# Patient Record
Sex: FEMALE | HISPANIC OR LATINO | Employment: OTHER
[De-identification: names, ages, dates, MRNs, and addresses within clinical notes are randomized per-mention and may not be internally consistent; named-entity substitution may affect disease eponyms.]

---

## 2017-10-22 ENCOUNTER — HEALTH MAINTENANCE LETTER (OUTPATIENT)
Age: 42
End: 2017-10-22

## 2017-11-03 ENCOUNTER — TELEPHONE (OUTPATIENT)
Dept: FAMILY MEDICINE | Facility: CLINIC | Age: 42
End: 2017-11-03

## 2017-11-03 NOTE — LETTER
November 3, 2017          Paige Moser  [address]  [facility] [address]            Dear Paige Moser,      At [facility] we care about your health and are committed to providing quality patient care. Regular appointments are a vital part of the care and management of your health and can help prevent many of the complications that can occur.      It has come to our attention that you are due for Diabetes check.  Please call [facility] at [phone] soon to schedule your follow up appointment.    If you have transferred care to another clinic please call to inform us so that we do not continue to send you reminder letters.      Sincerely,      [facility] Care Team/mary

## 2017-11-03 NOTE — TELEPHONE ENCOUNTER
Panel Management Review      Lab Results   Component Value Date    A1C 8.0 08/19/2014     Last Office Visit with this department: Visit date not found    Fail List measure: DM      Patient is due/failing the following:   A1C    Action needed:   Patient needs office visit for DM.    Type of outreach:    Sent letter.    Questions for provider review:    None                                                                                                                                    Snehal Chang CMA

## 2018-09-12 ENCOUNTER — OFFICE VISIT (OUTPATIENT)
Dept: URGENT CARE | Facility: URGENT CARE | Age: 43
End: 2018-09-12
Payer: COMMERCIAL

## 2018-09-12 VITALS
SYSTOLIC BLOOD PRESSURE: 131 MMHG | TEMPERATURE: 98.3 F | WEIGHT: 186 LBS | OXYGEN SATURATION: 96 % | BODY MASS INDEX: 36.33 KG/M2 | DIASTOLIC BLOOD PRESSURE: 88 MMHG | RESPIRATION RATE: 16 BRPM | HEART RATE: 90 BPM

## 2018-09-12 DIAGNOSIS — R29.898 RIGHT ARM WEAKNESS: ICD-10-CM

## 2018-09-12 DIAGNOSIS — M79.601 RIGHT ARM PAIN: Primary | ICD-10-CM

## 2018-09-12 PROCEDURE — 99214 OFFICE O/P EST MOD 30 MIN: CPT | Performed by: NURSE PRACTITIONER

## 2018-09-12 RX ORDER — METHYLPREDNISOLONE 4 MG
TABLET, DOSE PACK ORAL
Qty: 21 TABLET | Refills: 0 | Status: SHIPPED | OUTPATIENT
Start: 2018-09-12 | End: 2018-09-13

## 2018-09-12 ASSESSMENT — ENCOUNTER SYMPTOMS
FEVER: 0
SHORTNESS OF BREATH: 0
SORE THROAT: 0
DIARRHEA: 0
RHINORRHEA: 0
HEADACHES: 0
NAUSEA: 0
CHILLS: 0
COUGH: 0
VOMITING: 0

## 2018-09-12 ASSESSMENT — PAIN SCALES - GENERAL: PAINLEVEL: WORST PAIN (10)

## 2018-09-12 NOTE — MR AVS SNAPSHOT
After Visit Summary   9/12/2018    Paige Davis    MRN: [id number]           Patient Information     Date Of Birth          1975        Visit Information        Provider Department      9/12/2018 11:05 AM Jackelyn Escalona NP [facility]        Today's Diagnoses     Right arm pain    -  1    Right arm weakness          Care Instructions      Acute Pain, Uncertain Cause  Pain can be caused by many conditions that range from very minor to very serious. In some cases, though, pain comes and goes with no apparent cause.  We were not able to find the exact cause for your pain. At this time there is no sign of any serious illness causing your pain. More tests may be needed to determine the cause. In many cases, pain like this goes away by itself.  Home care  Take any medicines as prescribed. If another medicine was not prescribed for pain, you can take an over-the-counter pain medicine such as ibuprofen or acetaminophen. Use these as directed on the label.    Follow-up care  Follow up with your healthcare provider or our staff as directed.  When to seek medical advice  Call your healthcare provider for any of the following:    Pain changes in pattern    Pain doesn't lessen or gets worse    New symptoms appear    Fever of 100.4 F (38 C) or higher, or as directed by your healthcare provider  Date Last Reviewed: 7/26/2015 2000-2017 [employer] [address] [place] [address]. All rights reserved. This information is not intended as a substitute for professional medical care. Always follow your healthcare professional's instructions.                Follow-ups after your visit        Additional Services     NEUROLOGY ADULT REFERRAL       Your provider has referred you for the following:   Consult at [facility]: [facility] [phone]   [email or web address]    Please be aware that  coverage of these services is subject to the terms and limitations of your health insurance plan.  Call member services at your health plan with any benefit or coverage questions.      Please bring the following with you to your appointment:    (1) Any X-Rays, CTs or MRIs which have been performed.  Contact the facility where they were done to arrange for  prior to your scheduled appointment.    (2) List of current medications  (3) This referral request   (4) Any documents/labs given to you for this referral                  Who to contact     If you have questions or need follow up information about today's clinic visit or your schedule please contact [facility] directly at [phone].  Normal or non-critical lab and imaging results will be communicated to you by MyChart, letter or phone within 4 business days after the clinic has received the results. If you do not hear from us within 7 days, please contact the clinic through MyChart or phone. If you have a critical or abnormal lab result, we will notify you by phone as soon as possible.  Submit refill requests through [employer] or call your pharmacy and they will forward the refill request to us. Please allow 3 business days for your refill to be completed.          Additional Information About Your Visit        Care EveryWhere ID     This is your Care EveryWhere ID. This could be used by other organizations to access your [facility] medical records  [id number]        Your Vitals Were     Pulse Temperature Respirations Pulse Oximetry BMI (Body Mass Index)       90 98.3  F (36.8  C) (Oral) 16 96% 36.33 kg/m2        Blood Pressure from Last 3 Encounters:   09/12/18 131/88   12/22/16 130/82   09/04/14 130/88    Weight from Last 3 Encounters:   09/12/18 186 lb (84.4 kg)   12/22/16 194 lb (88 kg)   09/04/14 199 lb 6 oz (90.4 kg)              We Performed the Following     NEUROLOGY ADULT REFERRAL          Today's Medication Changes           These changes are accurate as of 9/12/18 11:44 AM.  If you have any questions, ask your nurse or doctor.               Start taking these medicines.        Dose/Directions    acetaminophen-codeine 300-30 MG per tablet   Commonly known as:  TYLENOL #3   Used for:  Right arm pain, Right arm weakness   Started by:  Jackelyn Escalona NP        Dose:  1 tablet   Take 1 tablet by mouth every 6 hours as needed for severe pain   Quantity:  16 tablet   Refills:  0       methylPREDNISolone 4 MG tablet   Commonly known as:  MEDROL DOSEPAK   Used for:  Right arm weakness, Right arm pain   Started by:  Jackelyn Escalona NP        Follow package instructions   Quantity:  21 tablet   Refills:  0            Where to get your medicines      These medications were sent to [facility] - [place] - [address]  [address], [place] [address]     Phone:  [phone]     methylPREDNISolone 4 MG tablet         Some of these will need a paper prescription and others can be bought over the counter.  Ask your nurse if you have questions.     Bring a paper prescription for each of these medications     acetaminophen-codeine 300-30 MG per tablet               Information about OPIOIDS     PRESCRIPTION OPIOIDS: WHAT YOU NEED TO KNOW   We gave you an opioid (narcotic) pain medicine. It is important to manage your pain, but opioids are not always the best choice. You should first try all the other options your care team gave you. Take this medicine for as short a time (and as few doses) as possible.    Some activities can increase your pain, such as bandage changes or therapy sessions. It may help to take your pain medicine 30 to 60 minutes before these activities. Reduce your stress by getting enough sleep, working on hobbies you enjoy and practicing relaxation or meditation. Talk to your care team about ways to manage your pain beyond prescription opioids.    These medicines have risks:    DO NOT drive when  on new or higher doses of pain medicine. These medicines can affect your alertness and reaction times, and you could be arrested for driving under the influence (DUI). If you need to use opioids long-term, talk to your care team about driving.    DO NOT operate heavy machinery    DO NOT do any other dangerous activities while taking these medicines.    DO NOT drink any alcohol while taking these medicines.     If the opioid prescribed includes acetaminophen, DO NOT take with any other medicines that contain acetaminophen. Read all labels carefully. Look for the word  acetaminophen  or  Tylenol.  Ask your pharmacist if you have questions or are unsure.    You can get addicted to pain medicines, especially if you have a history of addiction (chemical, alcohol or substance dependence). Talk to your care team about ways to reduce this risk.    All opioids tend to cause constipation. Drink plenty of water and eat foods that have a lot of fiber, such as fruits, vegetables, prune juice, apple juice and high-fiber cereal. Take a laxative (Miralax, milk of magnesia, Colace, Senna) if you don t move your bowels at least every other day. Other side effects include upset stomach, sleepiness, dizziness, throwing up, tolerance (needing more of the medicine to have the same effect), physical dependence and slowed breathing.    Store your pills in a secure place, locked if possible. We will not replace any lost or stolen medicine. If you don t finish your medicine, please throw away (dispose) as directed by your pharmacist. The [place] Pollution [employer] has more information about safe disposal: [email or web address]-medications         Primary Care Provider Office Phone # Fax #    Vinayak Huerta PA-C [phone] [id number]  [facility] [address]        Equal Access to Services     MARANDA SANCHEZ AH: eflicitas Michaud qaybta kaalmada  master elireuben bellamadonna topeteaan ah. So [facility] [phone].    ATENCIÓN: Si ector padilla, tiene a ghosh disposición servicios gratuitos de asistencia lingüística. Leon al [phone].    We comply with applicable federal civil rights laws and [place] laws. We do not discriminate on the basis of race, color, national origin, age, disability, sex, sexual orientation, or gender identity.            Thank you!     Thank you for choosing [facility]  for your care. Our goal is always to provide you with excellent care. Hearing back from our patients is one way we can continue to improve our services. Please take a few minutes to complete the written survey that you may receive in the mail after your visit with us. Thank you!             Your Updated Medication List - Protect others around you: Learn how to safely use, store and throw away your medicines at www.disposemymeds.org.          This list is accurate as of 9/12/18 11:44 AM.  Always use your most recent med list.                   Brand Name Dispense Instructions for use Diagnosis    acetaminophen-codeine 300-30 MG per tablet    TYLENOL #3    16 tablet    Take 1 tablet by mouth every 6 hours as needed for severe pain    Right arm pain, Right arm weakness       metFORMIN 500 MG tablet    GLUCOPHAGE    180 tablet    Take 1 tablet (500 mg) by mouth 2 times daily (with meals)    Type 2 diabetes, HbA1c goal < 7% (H)       methocarbamol 750 MG tablet    ROBAXIN    60 tablet    Take 1 tablet (750 mg) by mouth 3 times daily as needed    Musculoskeletal disorder involving upper trapezius muscle       methylPREDNISolone 4 MG tablet    MEDROL DOSEPAK    21 tablet    Follow package instructions    Right arm weakness, Right arm pain       naproxen 500 MG tablet    NAPROSYN    30 tablet    Take 1 tablet (500 mg) by mouth 2 times daily (with meals)    Musculoskeletal disorder involving upper trapezius muscle       order for DME     1 each     BRAND AS COVERED PER INSURANCE.    Type 2 diabetes, HbA1c goal < 7% (H)       order for DME     200 each    LANCETS BID    Type 2 diabetes, HbA1c goal < 7% (H)       order for DME     200 each    TEST STRIPS BID.  BRAND PER INSURANCE.    Type 2 diabetes, HbA1c goal < 7% (H)       paragard intrauterine copper     1 each    1 each by Intrauterine route once Placed in 2012        sulfamethoxazole-trimethoprim 800-160 MG per tablet    BACTRIM DS/SEPTRA DS    14 tablet    Take 1 tablet by mouth 2 times daily        triamcinolone 0.1 % cream    KENALOG    30 g    Apply sparingly to affected area three times daily for 14 days.    Dyshidrotic eczema

## 2018-09-12 NOTE — PROGRESS NOTES
SUBJECTIVE:   Paige Davis is a 42 year old female presenting with a chief complaint of   Chief Complaint   Patient presents with     Musculoskeletal Problem     Patient complains of pain in right arm since Tuesday       She is an established patient of [facility].    Right arm pain    Onset of symptoms was 1 week(s) ago. Not associated with any injury  Course of illness is worsening.    Severity moderate  Current and Associated symptoms: right arm pain  Treatment measures tried include oxycodone, flexeril given in ER yesterday. Xray was done and revealed no fracture .  Predisposing factors include None.        Review of Systems   Constitutional: Negative for chills and fever.   HENT: Negative for congestion, ear pain, rhinorrhea and sore throat.    Respiratory: Negative for cough and shortness of breath.    Gastrointestinal: Negative for diarrhea, nausea and vomiting.   Musculoskeletal:        Right arm pain   Neurological: Negative for headaches.   All other systems reviewed and are negative.      History reviewed. No pertinent past medical history.  History reviewed. No pertinent family history.  Current Outpatient Prescriptions   Medication Sig Dispense Refill     acetaminophen-codeine (TYLENOL #3) 300-30 MG per tablet Take 1 tablet by mouth every 6 hours as needed for severe pain 16 tablet 0     metFORMIN (GLUCOPHAGE) 500 MG tablet Take 1 tablet (500 mg) by mouth 2 times daily (with meals) 180 tablet 1     methocarbamol (ROBAXIN) 750 MG tablet Take 1 tablet (750 mg) by mouth 3 times daily as needed 60 tablet 0     methylPREDNISolone (MEDROL DOSEPAK) 4 MG tablet Follow package instructions 21 tablet 0     naproxen (NAPROSYN) 500 MG tablet Take 1 tablet (500 mg) by mouth 2 times daily (with meals) 30 tablet 1     ORDER FOR DME BRAND AS COVERED PER INSURANCE. 1 each 0     ORDER FOR DME LANCETS  each 4     ORDER FOR DME TEST STRIPS BID.  BRAND PER INSURANCE. 200 each 4     paragard intrauterine copper  1 each by Intrauterine route once Placed in 2012 1 each      sulfamethoxazole-trimethoprim (BACTRIM DS/SEPTRA DS) 800-160 MG per tablet Take 1 tablet by mouth 2 times daily 14 tablet 0     triamcinolone (KENALOG) 0.1 % cream Apply sparingly to affected area three times daily for 14 days. 30 g 0     Social History   Substance Use Topics     Smoking status: Never Smoker     Smokeless tobacco: Never Used     Alcohol use No       OBJECTIVE  /88 (BP Location: Right arm, Patient Position: Chair, Cuff Size: Adult Large)  Pulse 90  Temp 98.3  F (36.8  C) (Oral)  Resp 16  Wt 186 lb (84.4 kg)  SpO2 96%  BMI 36.33 kg/m2    Physical Exam   Cardiovascular: Normal rate and normal heart sounds.    Pulmonary/Chest: Effort normal and breath sounds normal.   Musculoskeletal:   Right arm with tenderness on any slight movement. No swelling, bruising or warmth. Pain radiates to right shoulder.  Patient is using a armsling. Pulses and sensation is intact.    Skin: Skin is warm.   Psychiatric: She has a normal mood and affect. Her behavior is normal. Judgment and thought content normal.       Labs:  No results found for this or any previous visit (from the past 24 hour(s)).      ASSESSMENT:      ICD-10-CM    1. Right arm pain M79.601 NEUROLOGY ADULT REFERRAL     acetaminophen-codeine (TYLENOL #3) 300-30 MG per tablet     methylPREDNISolone (MEDROL DOSEPAK) 4 MG tablet   2. Right arm weakness R29.898 NEUROLOGY ADULT REFERRAL     acetaminophen-codeine (TYLENOL #3) 300-30 MG per tablet     methylPREDNISolone (MEDROL DOSEPAK) 4 MG tablet    Differential Diagnosis:  Pinched nerve, arm infection,     Serious Comorbid Conditions:  Adult:  Diabetes    PLAN:  I will refer patient to neurology for a nerve evaluation. There is no history of injury, swelling, numbness but shooting pains that radiate to right shoulder. Xray in ER was normal.        There are no Patient Instructions on file for this visit.

## 2018-09-12 NOTE — PATIENT INSTRUCTIONS
Acute Pain, Uncertain Cause  Pain can be caused by many conditions that range from very minor to very serious. In some cases, though, pain comes and goes with no apparent cause.  We were not able to find the exact cause for your pain. At this time there is no sign of any serious illness causing your pain. More tests may be needed to determine the cause. In many cases, pain like this goes away by itself.  Home care  Take any medicines as prescribed. If another medicine was not prescribed for pain, you can take an over-the-counter pain medicine such as ibuprofen or acetaminophen. Use these as directed on the label.    Follow-up care  Follow up with your healthcare provider or our staff as directed.  When to seek medical advice  Call your healthcare provider for any of the following:    Pain changes in pattern    Pain doesn't lessen or gets worse    New symptoms appear    Fever of 100.4 F (38 C) or higher, or as directed by your healthcare provider  Date Last Reviewed: 7/26/2015 2000-2017 [employer]. [address] [place] [address], [place] [address]. All rights reserved. This information is not intended as a substitute for professional medical care. Always follow your healthcare professional's instructions.

## 2018-09-13 ENCOUNTER — OFFICE VISIT (OUTPATIENT)
Dept: NEUROLOGY | Facility: CLINIC | Age: 43
End: 2018-09-13
Attending: NURSE PRACTITIONER
Payer: COMMERCIAL

## 2018-09-13 VITALS
BODY MASS INDEX: 36.12 KG/M2 | HEIGHT: 60 IN | SYSTOLIC BLOOD PRESSURE: 155 MMHG | WEIGHT: 184 LBS | HEART RATE: 90 BPM | OXYGEN SATURATION: 96 % | DIASTOLIC BLOOD PRESSURE: 112 MMHG

## 2018-09-13 DIAGNOSIS — E66.01 MORBID OBESITY (H): ICD-10-CM

## 2018-09-13 DIAGNOSIS — R20.2 ARM PARESTHESIA, RIGHT: Primary | ICD-10-CM

## 2018-09-13 LAB
ALBUMIN SERPL-MCNC: 3.8 G/DL (ref 3.4–5)
ALP SERPL-CCNC: 172 U/L (ref 40–150)
ALT SERPL W P-5'-P-CCNC: 40 U/L (ref 0–50)
ANION GAP SERPL CALCULATED.3IONS-SCNC: 9 MMOL/L (ref 3–14)
AST SERPL W P-5'-P-CCNC: 26 U/L (ref 0–45)
BASOPHILS # BLD AUTO: 0 10E9/L (ref 0–0.2)
BASOPHILS NFR BLD AUTO: 0.2 %
BILIRUB SERPL-MCNC: 0.3 MG/DL (ref 0.2–1.3)
BUN SERPL-MCNC: 13 MG/DL (ref 7–30)
CALCIUM SERPL-MCNC: 9.1 MG/DL (ref 8.5–10.1)
CHLORIDE SERPL-SCNC: 105 MMOL/L (ref 94–109)
CK SERPL-CCNC: 46 U/L (ref 30–225)
CO2 SERPL-SCNC: 25 MMOL/L (ref 20–32)
CREAT SERPL-MCNC: 0.54 MG/DL (ref 0.52–1.04)
CRP SERPL-MCNC: 26.8 MG/L (ref 0–8)
DIFFERENTIAL METHOD BLD: ABNORMAL
EOSINOPHIL # BLD AUTO: 0 10E9/L (ref 0–0.7)
EOSINOPHIL NFR BLD AUTO: 0 %
ERYTHROCYTE [DISTWIDTH] IN BLOOD BY AUTOMATED COUNT: 12 % (ref 10–15)
ERYTHROCYTE [SEDIMENTATION RATE] IN BLOOD BY WESTERGREN METHOD: 45 MM/H (ref 0–20)
GFR SERPL CREATININE-BSD FRML MDRD: >90 ML/MIN/1.7M2
GLUCOSE SERPL-MCNC: 133 MG/DL (ref 70–99)
HCT VFR BLD AUTO: 39.6 % (ref 35–47)
HGB BLD-MCNC: 13.5 G/DL (ref 11.7–15.7)
IMM GRANULOCYTES # BLD: 0.1 10E9/L (ref 0–0.4)
IMM GRANULOCYTES NFR BLD: 0.4 %
LYMPHOCYTES # BLD AUTO: 1.5 10E9/L (ref 0.8–5.3)
LYMPHOCYTES NFR BLD AUTO: 11.4 %
MCH RBC QN AUTO: 29.3 PG (ref 26.5–33)
MCHC RBC AUTO-ENTMCNC: 34.1 G/DL (ref 31.5–36.5)
MCV RBC AUTO: 86 FL (ref 78–100)
MONOCYTES # BLD AUTO: 0.3 10E9/L (ref 0–1.3)
MONOCYTES NFR BLD AUTO: 2.6 %
NEUTROPHILS # BLD AUTO: 11 10E9/L (ref 1.6–8.3)
NEUTROPHILS NFR BLD AUTO: 85.4 %
PLATELET # BLD AUTO: 308 10E9/L (ref 150–450)
POTASSIUM SERPL-SCNC: 4.5 MMOL/L (ref 3.4–5.3)
PROT SERPL-MCNC: 8.8 G/DL (ref 6.8–8.8)
RBC # BLD AUTO: 4.6 10E12/L (ref 3.8–5.2)
SODIUM SERPL-SCNC: 139 MMOL/L (ref 133–144)
TSH SERPL DL<=0.005 MIU/L-ACNC: 0.99 MU/L (ref 0.4–4)
VIT B12 SERPL-MCNC: 544 PG/ML (ref 193–986)
WBC # BLD AUTO: 12.9 10E9/L (ref 4–11)

## 2018-09-13 PROCEDURE — 80050 GENERAL HEALTH PANEL: CPT | Performed by: PSYCHIATRY & NEUROLOGY

## 2018-09-13 PROCEDURE — 36415 COLL VENOUS BLD VENIPUNCTURE: CPT | Performed by: PSYCHIATRY & NEUROLOGY

## 2018-09-13 PROCEDURE — 83516 IMMUNOASSAY NONANTIBODY: CPT | Mod: 59 | Performed by: PSYCHIATRY & NEUROLOGY

## 2018-09-13 PROCEDURE — 86480 TB TEST CELL IMMUN MEASURE: CPT | Performed by: PSYCHIATRY & NEUROLOGY

## 2018-09-13 PROCEDURE — 86618 LYME DISEASE ANTIBODY: CPT | Performed by: PSYCHIATRY & NEUROLOGY

## 2018-09-13 PROCEDURE — 82550 ASSAY OF CK (CPK): CPT | Performed by: PSYCHIATRY & NEUROLOGY

## 2018-09-13 PROCEDURE — 86140 C-REACTIVE PROTEIN: CPT | Performed by: PSYCHIATRY & NEUROLOGY

## 2018-09-13 PROCEDURE — 99205 OFFICE O/P NEW HI 60 MIN: CPT | Performed by: PSYCHIATRY & NEUROLOGY

## 2018-09-13 PROCEDURE — 82525 ASSAY OF COPPER: CPT | Mod: 90 | Performed by: PSYCHIATRY & NEUROLOGY

## 2018-09-13 PROCEDURE — 83876 ASSAY MYELOPEROXIDASE: CPT | Performed by: PSYCHIATRY & NEUROLOGY

## 2018-09-13 PROCEDURE — 82607 VITAMIN B-12: CPT | Performed by: PSYCHIATRY & NEUROLOGY

## 2018-09-13 PROCEDURE — 83516 IMMUNOASSAY NONANTIBODY: CPT | Performed by: PSYCHIATRY & NEUROLOGY

## 2018-09-13 PROCEDURE — 86235 NUCLEAR ANTIGEN ANTIBODY: CPT | Performed by: PSYCHIATRY & NEUROLOGY

## 2018-09-13 PROCEDURE — 86787 VARICELLA-ZOSTER ANTIBODY: CPT | Performed by: PSYCHIATRY & NEUROLOGY

## 2018-09-13 PROCEDURE — 82164 ANGIOTENSIN I ENZYME TEST: CPT | Mod: 90 | Performed by: PSYCHIATRY & NEUROLOGY

## 2018-09-13 PROCEDURE — 86431 RHEUMATOID FACTOR QUANT: CPT | Performed by: PSYCHIATRY & NEUROLOGY

## 2018-09-13 PROCEDURE — 85652 RBC SED RATE AUTOMATED: CPT | Performed by: PSYCHIATRY & NEUROLOGY

## 2018-09-13 PROCEDURE — 84207 ASSAY OF VITAMIN B-6: CPT | Mod: 90 | Performed by: PSYCHIATRY & NEUROLOGY

## 2018-09-13 PROCEDURE — 86038 ANTINUCLEAR ANTIBODIES: CPT | Performed by: PSYCHIATRY & NEUROLOGY

## 2018-09-13 PROCEDURE — 86780 TREPONEMA PALLIDUM: CPT | Performed by: PSYCHIATRY & NEUROLOGY

## 2018-09-13 PROCEDURE — 99000 SPECIMEN HANDLING OFFICE-LAB: CPT | Performed by: PSYCHIATRY & NEUROLOGY

## 2018-09-13 PROCEDURE — 82085 ASSAY OF ALDOLASE: CPT | Mod: 90 | Performed by: PSYCHIATRY & NEUROLOGY

## 2018-09-13 PROCEDURE — 86039 ANTINUCLEAR ANTIBODIES (ANA): CPT | Mod: 59 | Performed by: PSYCHIATRY & NEUROLOGY

## 2018-09-13 RX ORDER — NORTRIPTYLINE HCL 25 MG
25 CAPSULE ORAL AT BEDTIME
Qty: 30 CAPSULE | Refills: 3 | Status: SHIPPED | OUTPATIENT
Start: 2018-09-13

## 2018-09-13 RX ORDER — OXYCODONE AND ACETAMINOPHEN 5; 325 MG/1; MG/1
1 TABLET ORAL EVERY 6 HOURS PRN
COMMUNITY
End: 2019-02-12

## 2018-09-13 ASSESSMENT — PAIN SCALES - GENERAL: PAINLEVEL: WORST PAIN (10)

## 2018-09-13 NOTE — PATIENT INSTRUCTIONS
Get MRI of the brain and cervical spine    Will get blood work    EMG    Start Nortriptyline 25mg by mouth at bed time      Nortriptyline oral solution  Brand Names: Aventyl, Pamelor   Qué es vasu medicamento?  La NORTRIPTILINA se utiliza para tratar la depresión.   Cómo nohelia utilizar vasu medicamento?  [place] vasu medicamento por vía oral. Siga las instrucciones de la etiqueta del medicamento. Utilice valerie cuchara o un recipiente dosificador especialmente marcado para medir cada dosis. Si no tiene estos elementos, consulte a ghosh farmacéutico. Las cucharas domésticas no son exactas. [place] spring dosis a intervalos regulares. No tome ghosh medicamento con valerie frecuencia mayor a la indicada. No deje de delvis vasu medicamento repentinamente a menos que así indique ghosh médico. El detener vasu medicamento demasiado rápido puede causar efectos secundarios graves o puede empeorar ghosh condición.   Ghosh farmacéutico le dará valerie Guía del medicamento especial con cada receta y relleno. Asegúrese de leer esta información cada vez cuidadosamente.  Hable con ghosh pediatra para informarse acerca del uso de vasu medicamento en niños. Puede requerir atención especial.   Qué efectos secundarios puedo tener al utilizar vasu medicamento?  Efectos secundarios que debe informar a ghosh médico o a ghosh profesional de la saniya tan pronto marissa sea posible:  reacciones alérgicas, marissa erupción cutánea, comezón/picazón o urticarias, e hinchazón de la amadou, los labios o la lengua ansiedad problemas respiratorios cambios en la visión confusión estado de ánimo elevado, laurel necesidad de dormir, pensamientos acelerados, conducta impulsiva dolor ocular ritmo cardiaco rápido, irregular sensación de desmayos o aturdimiento, caídas sensación de agitación, enojo o irritabilidad fiebre con aumento de la sudoración alucinaciones, pérdida del contacto con la realidad convulsiones rigidez de los músculos ideas suicidas u otros cambios en el estado de ánimo hormigueo, dolor o  entumecimiento de los pies o las shelton dificultad para orinar o cambios en el volumen de orina dificultad para conciliar el sueño cansancio o debilidad inusual vómito color amarillento de los ojos o la piel  Efectos secundarios que generalmente no requieren atención médica (infórmelos a ghosh médico o a ghosh profesional de la saniya si persisten o si son molestos):  cambios en el deseo o desempeño sexual cambios en el apetito o el peso estreñimiento mareos boca seca náuseas cansancio temblores malestar estomacal   Qué puede interactuar con vasu medicamento?  No tome esta medicina con ninguno de los siguientes medicamentos:    trióxido de arsénico    ciertos medicamentos para el pulso cardiaco irregular    cisapride    halofantrina    linezolid    IMAOs, tales marissa Carbex, Eldepryl, Marplan, Nardil y Parnate    ebonie de metileno (vía intravenosa)    otros medicamentos para la depresión mental    fenotiazinas, tales marissa perfenacina, tioridazina y clorpromacina    pimozida    probucol    procarbazina    esparfloxacino    hierba de [place]    ziprasidona  Esta medicina también puede interactuar con cualquiera de los siguientes medicamentos:    atropina y medicamentos relacionados, marissa la hiosciamina, escopolamina, tolterodina y otros    barbitúricos para inducir el sueño o para el tratamiento de convulsiones, tales marissa el fenobarbital    cimetidina    medicamentos para la diabetes    medicamentos para las convulsiones, marissa carbamazepina o fenitoína    reserpina    medicamentos tiroideos   Qué sucede si me olvido de valerie dosis?  Si olvida valerie dosis, tómela lo antes posible. Si es zenaida la hora de la próxima dosis, tome sólo marcela dosis. No tome dosis adicionales o dobles.   Dónde nohelia guardar mi medicina?  Manténgala fuera del alcance de los niños.  Guárdela a temperatura ambiente, entre 15 y 30 grados C (59 y 86 grados F). Mantenga el envase juan cerrado. Protéjala glendy. Deseche todo el medicamento que no haya utilizado,  después de la fecha de vencimiento.   Qué le nohelia informar a mi profesional de la saniya antes de delvis vasu medicamento?  Necesita saber si usted presenta alguno de los siguientes problemas o situaciones:    trastorno bipolar o esquizofrenia    glaucoma    enfermedad cardiaca o ataque cardiaco reciente    si consume bebidas alcohólicas con frecuencia    enfermedad hepática    hipertiroidismo    convulsiones    ideas suicidas, planeadas o intento de suicidio previo o antecedentes familiares de intento de suicidio    dificultad para orinar o problema de próstata    valerie reacción alérgica o inusual a la nortriptilina, a otros medicamentos, alimentos, colorantes o conservantes    si está embarazada o buscando quedar embarazada    si está amamantando a un bebé   A qué nohelia estar atento al usar vasu medicamento?  Informe a ghosh médico si spring síntomas no mejoran o si empeoran. Visite a ghosh médico o a ghosh profesional de la saniya para chequear ghosh evolución periódicamente. Debido que puede ser necesario delvis vasu medicamento kaykay varias semanas para que sea posible observar spring efectos en forma completa, es importante que sigue ghosh tratamiento marissa recetado por ghosh médico.   Los pacientes y spring familias deben estar atentos si empeora la depresión o ideas suicidas. También esté atento a cambios repentinos o severos de emoción, tales marissa el sentirse ansioso, agitado, lleno de pánico, irritable, hostil, agresivo, impulsivo, inquietud severa, demasiado excitado y hiperactivo o dificultad para conciliar el sueño. Si esto ocurre, especialmente al comenzar con un tratamiento antidepresivo o al cambiar de dosis, comuníquese con ghosh profesional de la saniya.  Puede experimentar somnolencia o mareos. No conduzca ni utilice maquinaria ni tanja nada que le exija permanecer en estado de alerta hasta que sepa cómo le afecta vasu medicamento. No se siente ni se ponga de pie con rapidez, especialmente si es un paciente de edad avanzada. [place]  reduce el riesgo de mareos o desmayos. El alcohol puede interferir con el efecto de vasu medicamento. Evite consumir bebidas alcohólicas.  No se trate usted mismo si tiene tos, resfrío o alergias sin consultar con ghosh médico o con ghosh profesional de la saniya. Algunos ingredientes pueden aumentar los posibles efectos secundarios.  Se le podrá secar la boca. Masticar chicle sin azúcar, chupar caramelos duros y delvis agua en abundancia lo ayudará a mantener la boca húmeda. Si el problema no desaparece o es severo, consulte a ghosh médico.  Vasu medicamento puede resecarle los ojos y provocar visión borrosa. Si usa lentes de contacto, puede sentir ciertas molestias. Las gotas lubricantes pueden ser útiles. Si el problema no desaparece o es severo, consulte con ghosh médico de los ojos.  Vasu medicamento causará estreñimiento. Trate de evacuar los intestinos al menos cada 2 ó 3 días. Si no evacua los intestinos kaykay 3 días, comuníquese con ghosh médico o con ghosh profesional de la saniya.  Vasu medicamento puede aumentar la sensibilidad al sol. Manténgase fuera glendy solar. Si no lo puede evitar, utilice ropa protectora y crema de protección solar. No utilice lámparas jude, luis alfredo jude ni cabinas jude.    [id number] [employer]. [address] [place] [address], [place] [address]. Todos los derechos reservados. Esta información no pretende sustituir la atención médica profesional. Sólo ghosh médico puede diagnosticar y tratar un problema de saniya.    You saw a neurology provider, Vinayak Agudelo, today at the [facility].  You may have also met with the MS RN Care Coordinator.  In order to get a message to your MS Center provider, you should contact [phone]. You should contact us via this protocol if you have any of the following symptoms:    New or worsening neurologic symptoms that persist for 24-48 hours, such as:  o New onset of pain or marked worsening of  pain  o Difficulty with speech, swallowing, or breathing  o New onset of vertigo or dizziness  o Change in bowel or bladder function (incontinence, difficulty urinating)    Increasing difficulty in self care    Marked changes in vision (double vision, blurred vision, graying of vision)    Change in mobility    Change in cognitive function    Falling    Worsening numbness, tingling or pain with a change in function    Worsening fatigue lasting more than 2 weeks  If you had labs completed today, we will contact you with the results.  If you are active in [employer], they will be released to you there.  Otherwise, your results will be provided to you via mail or telephone call.  Some results take up to 2 weeks for completion.  If you haven t heard anything about your lab results within 2 weeks, you can call or send a [employer] message to obtain your results.  If you have an MRI scheduled in the week or two prior to your next appointment, we will go over the results at your scheduled follow up appointment.  If you are not scheduled to see your MS Center provider within about 2 weeks after your MRI, please call or send a [employer] message to obtain your results if you haven t heard anything within 2 weeks.  Please be aware that it takes at least 5 business days after routine MRIs for your results to be reviewed by both the radiologist and your doctor.  MRIs completed at facilities outside of the [facility] system take about 2 weeks in order for the MRI disc to be mailed to our clinic and uploaded into your medical record.    Please contact your pharmacy to request refills of your medications.   Please do your best to come to your appointments, and to arrive 15 minutes early to allow time for checking in.  [facility] reserves the right to terminate care of established patients if a patient misses three or more appointments in a clinic without providing notification within a 12-month period.    Developing  Your Care Team  Individuals living with chronic illnesses like MS may be unaware that they are at risk for the same range of medical problems as everyone else.  This is why you must establish a relationship with other health care providers in addition to your Multiple Sclerosis doctor.  It can be difficult at times to figure out whether a health concern is related to your MS, or whether it is related to something else, such as hormonal changes, pseduoexacerbations, changes in your core body temperature, flu-like reactions to interferons, exercise, or infections.  Urinary tract infections (UTIs) are common culprits that can cause fatigue, weakness, or other  MS attack -like symptoms without classic symptoms of a UTI.  For this reason, if you call or come in to discuss symptoms, you may be asked to get in touch with your primary provider or another specialist, so that you receive the comprehensive care you need.  What is Multiple Sclerosis (MS)?  MS is a disease in which the nerve tissues in the brain and/or spinal cord are attacked by immune cells in the body.  These immune cells are present in everyone, and their normal role is to fight off infections.  In people with MS, these cells change the way they function and cross into the nervous system.  Once there, they cause inflammation that damages the myelin (or the protective coating of a nerve cell, much like the plastic covering on an electrical cord) and parts of the nerve cell itself.  So far, a clear cause for this immune system dysfunction has not been found.  MS often starts out as the  relapsing-remitting  form.  This means there are episodes when you have symptoms, and other times when you recover to normal or near-normal.  Over time, if the damage to the nervous system continues, the disease can cause additional disability, such as difficulty walking.  If the relapses and nerve damage can be prevented with available medications, many patients with MS can go  many years between relapses and have relatively little disability.  Remember: MS is a condition that changes and must be evaluated on an ongoing basis!  What is a Relapse? (Also called flare-ups, attacks, or exacerbations)  Relapses are due to the occurrence of inflammation in some part of the brain and/or spinal cord.  A relapse is new or recurrent symptoms which persist for at least 24 hours and sometimes worsen over 48 hours.  New symptoms need to be  by at least 1 month in order to be considered separate relapses. Most of the time, symptoms reach their maximal intensity within 2 weeks and then begin to slowly resolve.  At times, your symptoms may not recover fully for up to 6 months, depending on the severity of the episode.  The frequency of relapses is generally higher early in the disease, but can vary greatly among individuals with MS.  Improvement of symptoms for an individual is unpredictable with each relapse.    It is important to remember that an increase in symptoms and changes in function may not necessarily be a relapse.  There are other factors that contribute to such changes, such as hot weather, increased body temperature, infection/illness, stress and sleep deprivation.  The worsening of symptoms may feel like a relapse when in reality it is not.  These episodes are referred to as pseudorelapses.  Once the underlying cause is addressed, symptoms usually fade away and you feel better.  If you experience a worsening of symptoms that lasts more than 48 hours and does not improve with cooling down, decreasing stress, or treatment of an infection, please call us and we can help to better determine whether you are having a pseudorelapse versus a relapse.

## 2018-09-13 NOTE — MR AVS SNAPSHOT
After Visit Summary   9/13/2018    Paige Davis    MRN: [id number]           Patient Information     Date Of Birth          1975        Visit Information        Provider Department      9/13/2018 2:30 PM Vinayak Agudelo MD; [facility]        Today's Diagnoses     Arm paresthesia, right    -  1    Morbid obesity (H)          Care Instructions    Get MRI of the brain and cervical spine    Will get blood work    EMG    Start Nortriptyline 25mg by mouth at bed time      Nortriptyline oral solution  Brand Names: Aventyl, Pamelor   Qué es vasu medicamento?  La NORTRIPTILINA se utiliza para tratar la depresión.   Cómo nohelia utilizar vasu medicamento?  [place] vasu medicamento por vía oral. Siga las instrucciones de la etiqueta del medicamento. Utilice valerie cuchara o un recipiente dosificador especialmente marcado para medir cada dosis. Si no tiene estos elementos, consulte a capps farmacéutico. Las cucharas domésticas no son exactas. [place] spring dosis a intervalos regulares. No tome capps medicamento con valerie frecuencia mayor a la indicada. No deje de delvis vasu medicamento repentinamente a menos que así indique capps médico. El detener vasu medicamento demasiado rápido puede causar efectos secundarios graves o puede empeorar capps condición.   Capps farmacéutico le dará valerie Guía del medicamento especial con cada receta y relleno. Asegúrese de leer esta información cada vez cuidadosamente.  Hable con capps pediatra para informarse acerca del uso de vasu medicamento en niños. Puede requerir atención especial.   Qué efectos secundarios puedo tener al utilizar vasu medicamento?  Efectos secundarios que debe informar a capps médico o a capps profesional de la saniya tan pronto marissa sea posible:  reacciones alérgicas, marissa erupción cutánea, comezón/picazón o urticarias, e hinchazón de la amadou, los labios o la lengua ansiedad problemas respiratorios cambios en la visión confusión  estado de ánimo elevado, laurel necesidad de dormir, pensamientos acelerados, conducta impulsiva dolor ocular ritmo cardiaco rápido, irregular sensación de desmayos o aturdimiento, caídas sensación de agitación, enojo o irritabilidad fiebre con aumento de la sudoración alucinaciones, pérdida del contacto con la realidad convulsiones rigidez de los músculos ideas suicidas u otros cambios en el estado de ánimo hormigueo, dolor o entumecimiento de los pies o las shelton dificultad para orinar o cambios en el volumen de orina dificultad para conciliar el sueño cansancio o debilidad inusual vómito color amarillento de los ojos o la piel  Efectos secundarios que generalmente no requieren atención médica (infórmelos a ghosh médico o a ghosh profesional de la saniya si persisten o si son molestos):  cambios en el deseo o desempeño sexual cambios en el apetito o el peso estreñimiento mareos boca seca náuseas cansancio temblores malestar estomacal   Qué puede interactuar con vasu medicamento?  No tome esta medicina con ninguno de los siguientes medicamentos:    trióxido de arsénico    ciertos medicamentos para el pulso cardiaco irregular    cisapride    halofantrina    linezolid    IMAOs, tales marissa Carbex, Eldepryl, Marplan, Nardil y Parnate    ebonie de metileno (vía intravenosa)    otros medicamentos para la depresión mental    fenotiazinas, tales marissa perfenacina, tioridazina y clorpromacina    pimozida    probucol    procarbazina    esparfloxacino    hierba de [place]    ziprasidona  Esta medicina también puede interactuar con cualquiera de los siguientes medicamentos:    atropina y medicamentos relacionados, marissa la hiosciamina, escopolamina, tolterodina y otros    barbitúricos para inducir el sueño o para el tratamiento de convulsiones, tales marissa el fenobarbital    cimetidina    medicamentos para la diabetes    medicamentos para las convulsiones, marissa carbamazepina o fenitoína    reserpina    medicamentos tiroideos   Qué sucede  si me olvido de valerie dosis?  Si olvida valerie dosis, tómela lo antes posible. Si es zenaida la hora de la próxima dosis, tome sólo marcela dosis. No tome dosis adicionales o dobles.   Dónde nohelia guardar mi medicina?  [place]ant[place]ngala fuera del alcance de los niños.  Guárdela a temperatura ambiente, entre 15 y 30 grados C (59 y 86 grados F). Mantenga el envase juan cerrado. Protéjala glendy. Deseche todo el medicamento que no haya utilizado, después de la fecha de vencimiento.   Qué le nohelia informar a mi profesional de la saniya antes de delvis vasu medicamento?  Necesita saber si usted presenta alguno de los siguientes problemas o situaciones:    trastorno bipolar o esquizofrenia    glaucoma    enfermedad cardiaca o ataque cardiaco reciente    si consume bebidas alcohólicas con frecuencia    enfermedad hepática    hipertiroidismo    convulsiones    ideas suicidas, planeadas o intento de suicidio previo o antecedentes familiares de intento de suicidio    dificultad para orinar o problema de próstata    valerie reacción alérgica o inusual a la nortriptilina, a otros medicamentos, alimentos, colorantes o conservantes    si está embarazada o buscando quedar embarazada    si está amamantando a un bebé   A qué nohelia estar atento al usar vasu medicamento?  Informe a ghosh médico si spring síntomas no mejoran o si empeoran. Visite a ghosh médico o a ghosh profesional de la saniya para chequear ghosh evolución periódicamente. Debido que puede ser necesario delvis vasu medicamento kaykay varias semanas para que sea posible observar spring efectos en forma completa, es importante que sigue ghosh tratamiento marissa recetado por ghosh médico.   Los pacientes y spring familias deben estar atentos si empeora la depresión o ideas suicidas. También esté atento a cambios repentinos o severos de emoción, tales marissa el sentirse ansioso, agitado, lleno de pánico, irritable, hostil, agresivo, impulsivo, inquietud severa, demasiado excitado y hiperactivo o dificultad para conciliar el  sueño. Si esto ocurre, especialmente al comenzar con un tratamiento antidepresivo o al cambiar de dosis, comuníquese con ghosh profesional de la saniya.  Puede experimentar somnolencia o mareos. No conduzca ni utilice maquinaria ni tanja nada que le exija permanecer en estado de alerta hasta que sepa cómo le afecta ofelia medicamento. No se siente ni se ponga de pie con rapidez, especialmente si es un paciente de edad avanzada. [place] reduce el riesgo de mareos o desmayos. El alcohol puede interferir con el efecto de ofelia medicamento. Evite consumir bebidas alcohólicas.  No se trate usted mismo si tiene tos, resfrío o alergias sin consultar con ghosh médico o con ghosh profesional de la saniya. Algunos ingredientes pueden aumentar los posibles efectos secundarios.  Se le podrá secar la boca. Masticar chicle sin azúcar, chupar caramelos duros y delvis agua en abundancia lo ayudará a mantener la boca húmeda. Si el problema no desaparece o es severo, consulte a ghosh médico.  Ofeila medicamento puede resecarle los ojos y provocar visión borrosa. Si usa lentes de contacto, puede sentir ciertas molestias. Las gotas lubricantes pueden ser útiles. Si el problema no desaparece o es severo, consulte con ghosh médico de los ojos.  Ofelia medicamento causará estreñimiento. Trate de evacuar los intestinos al menos cada 2 ó 3 días. Si no evacua los intestinos kaykay 3 días, comuníquese con ghosh médico o con ghosh profesional de la saniya.  Ofelia medicamento puede aumentar la sensibilidad al sol. Manténgase fuera glendy solar. Si no lo puede evitar, utilice ropa protectora y crema de protección solar. No utilice lámparas jude, luis alfredo jude ni cabinas jude.    [id number] [employer]. [address] [place] [address], [place] [address]. Todos los derechos reservados. Esta información no pretende sustituir la atención médica profesional. Sólo ghosh médico puede diagnosticar y tratar un problema de saniya.    You saw a neurology provider, Vinayak Agudelo,  today at the [facility].  You may have also met with the MS RN Care Coordinator.  In order to get a message to your MS Center provider, you should contact [phone]. You should contact us via this protocol if you have any of the following symptoms:    New or worsening neurologic symptoms that persist for 24-48 hours, such as:  o New onset of pain or marked worsening of pain  o Difficulty with speech, swallowing, or breathing  o New onset of vertigo or dizziness  o Change in bowel or bladder function (incontinence, difficulty urinating)    Increasing difficulty in self care    Marked changes in vision (double vision, blurred vision, graying of vision)    Change in mobility    Change in cognitive function    Falling    Worsening numbness, tingling or pain with a change in function    Worsening fatigue lasting more than 2 weeks  If you had labs completed today, we will contact you with the results.  If you are active in [facility], they will be released to you there.  Otherwise, your results will be provided to you via mail or telephone call.  Some results take up to 2 weeks for completion.  If you haven t heard anything about your lab results within 2 weeks, you can call or send a [facility] message to obtain your results.  If you have an MRI scheduled in the week or two prior to your next appointment, we will go over the results at your scheduled follow up appointment.  If you are not scheduled to see your MS Center provider within about 2 weeks after your MRI, please call or send a [facility] message to obtain your results if you haven t heard anything within 2 weeks.  Please be aware that it takes at least 5 business days after routine MRIs for your results to be reviewed by both the radiologist and your doctor.  MRIs completed at facilities outside of the [facility] system take about 2 weeks in order for the MRI disc to be mailed to our clinic and uploaded into your medical record.     Please contact your pharmacy to request refills of your medications.   Please do your best to come to your appointments, and to arrive 15 minutes early to allow time for checking in.  [facility] reserves the right to terminate care of established patients if a patient misses three or more appointments in a clinic without providing notification within a 12-month period.    Developing Your Care Team  Individuals living with chronic illnesses like MS may be unaware that they are at risk for the same range of medical problems as everyone else.  This is why you must establish a relationship with other health care providers in addition to your Multiple Sclerosis doctor.  It can be difficult at times to figure out whether a health concern is related to your MS, or whether it is related to something else, such as hormonal changes, pseduoexacerbations, changes in your core body temperature, flu-like reactions to interferons, exercise, or infections.  Urinary tract infections (UTIs) are common culprits that can cause fatigue, weakness, or other  MS attack -like symptoms without classic symptoms of a UTI.  For this reason, if you call or come in to discuss symptoms, you may be asked to get in touch with your primary provider or another specialist, so that you receive the comprehensive care you need.  What is Multiple Sclerosis (MS)?  MS is a disease in which the nerve tissues in the brain and/or spinal cord are attacked by immune cells in the body.  These immune cells are present in everyone, and their normal role is to fight off infections.  In people with MS, these cells change the way they function and cross into the nervous system.  Once there, they cause inflammation that damages the myelin (or the protective coating of a nerve cell, much like the plastic covering on an electrical cord) and parts of the nerve cell itself.  So far, a clear cause for this immune system dysfunction has not been  found.  MS often starts out as the  relapsing-remitting  form.  This means there are episodes when you have symptoms, and other times when you recover to normal or near-normal.  Over time, if the damage to the nervous system continues, the disease can cause additional disability, such as difficulty walking.  If the relapses and nerve damage can be prevented with available medications, many patients with MS can go many years between relapses and have relatively little disability.  Remember: MS is a condition that changes and must be evaluated on an ongoing basis!  What is a Relapse? (Also called flare-ups, attacks, or exacerbations)  Relapses are due to the occurrence of inflammation in some part of the brain and/or spinal cord.  A relapse is new or recurrent symptoms which persist for at least 24 hours and sometimes worsen over 48 hours.  New symptoms need to be  by at least 1 month in order to be considered separate relapses. Most of the time, symptoms reach their maximal intensity within 2 weeks and then begin to slowly resolve.  At times, your symptoms may not recover fully for up to 6 months, depending on the severity of the episode.  The frequency of relapses is generally higher early in the disease, but can vary greatly among individuals with MS.  Improvement of symptoms for an individual is unpredictable with each relapse.    It is important to remember that an increase in symptoms and changes in function may not necessarily be a relapse.  There are other factors that contribute to such changes, such as hot weather, increased body temperature, infection/illness, stress and sleep deprivation.  The worsening of symptoms may feel like a relapse when in reality it is not.  These episodes are referred to as pseudorelapses.  Once the underlying cause is addressed, symptoms usually fade away and you feel better.  If you experience a worsening of symptoms that lasts more than 48 hours and does not improve  with cooling down, decreasing stress, or treatment of an infection, please call us and we can help to better determine whether you are having a pseudorelapse versus a relapse.                  Follow-ups after your visit        Follow-up notes from your care team     Return if symptoms worsen or fail to improve.      Your next 10 appointments already scheduled     Sep 14, 2018  1:45 PM CDT   EMG with Javon Suarez MD   [facility]    [id number] [place] [id number] [phone]            Sep 24, 2018  8:00 AM CDT   MR CERVICAL SPINE W/O CONTRAST with MGMR1   [facility]    [id number] [place] [id number] [phone]           Take your medicines as usual, unless your doctor tells you not to. Bring a list of your current medicines to your exam (including vitamins, minerals and over-the-counter drugs). Also bring the results of similar scans you may have had.  Please remove any body piercings and hair extensions before you arrive.  Follow your doctor s orders. If you do not, we may have to postpone your exam.  You may or may not receive IV contrast for this exam pending the discretion of the Radiologist.  You do not need to do anything special to prepare.  The MRI machine uses a strong magnet. Please wear clothes without metal (snaps, zippers). A sweatsuit works well, or we may give you a hospital gown.   **IMPORTANT** THE INSTRUCTIONS BELOW ARE ONLY FOR THOSE PATIENTS WHO HAVE BEEN PRESCRIBED SEDATION OR GENERAL ANESTHESIA DURING THEIR MRI PROCEDURE:  IF YOUR DOCTOR PRESCRIBED ORAL SEDATION (take medicine to help you relax during your exam):   You must get the medicine from your doctor (oral medication) before you arrive. Bring the medicine to the exam. Do not take it at home. You ll be told when to take it upon arriving for your exam.   Arrive one hour early. Bring someone who can take  you home after the test. Your medicine will make you sleepy. After the exam, you may not drive, take a bus or take a taxi by yourself.  IF YOUR DOCTOR PRESCRIBED IV SEDATION:   Arrive one hour early. Bring someone who can take you home after the test. Your medicine will make you sleepy. After the exam, you may not drive, take a bus or take a taxi by yourself.   No eating 6 hours before your exam. You may have clear liquids up until 4 hours before your exam. (Clear liquids include water, clear tea, black coffee and fruit juice without pulp.)  IF YOUR DOCTOR PRESCRIBED ANESTHESIA (be asleep for your exam):   Arrive 1 1/2 hours early. Bring someone who can take you home after the test. You may not drive, take a bus or take a taxi by yourself.   No eating 8 hours before your exam. You may have clear liquids up until 4 hours before your exam. (Clear liquids include water, clear tea, black coffee and fruit juice without pulp.)   You will spend four to five hours in the recovery room.  Please call the Imaging Department at your exam site with any questions.            Sep 24, 2018  8:45 AM CDT   MR BRAIN W/O & W CONTRAST with MGMR1   [facility]    [address] [place] [id number] [phone]           Take your medicines as usual, unless your doctor tells you not to. Bring a list of your current medicines to your exam (including vitamins, minerals and over-the-counter drugs).  You may or may not receive intravenous (IV) contrast for this exam pending the discretion of the Radiologist.  You do not need to do anything special to prepare.  The MRI machine uses a strong magnet. Please wear clothes without metal (snaps, zippers). A sweatsuit works well, or we may give you a hospital gown.  Please remove any body piercings and hair extensions before you arrive. You will also remove watches, jewelry, hairpins, wallets, dentures, partial dental plates and hearing  aids. You may wear contact lenses, and you may be able to wear your rings. We have a safe place to keep your personal items, but it is safer to leave them at home.  **IMPORTANT** THE INSTRUCTIONS BELOW ARE ONLY FOR THOSE PATIENTS WHO HAVE BEEN PRESCRIBED SEDATION OR GENERAL ANESTHESIA DURING THEIR MRI PROCEDURE:  IF YOUR DOCTOR PRESCRIBED ORAL SEDATION (take medicine to help you relax during your exam):   You must get the medicine from your doctor (oral medication) before you arrive. Bring the medicine to the exam. Do not take it at home. You ll be told when to take it upon arriving for your exam.   Arrive one hour early. Bring someone who can take you home after the test. Your medicine will make you sleepy. After the exam, you may not drive, take a bus or take a taxi by yourself.  IF YOUR DOCTOR PRESCRIBED IV SEDATION:   Arrive one hour early. Bring someone who can take you home after the test. Your medicine will make you sleepy. After the exam, you may not drive, take a bus or take a taxi by yourself.   No eating 6 hours before your exam. You may have clear liquids up until 4 hours before your exam. (Clear liquids include water, clear tea, black coffee and fruit juice without pulp.)  IF YOUR DOCTOR PRESCRIBED ANESTHESIA (be asleep for your exam):   Arrive 1 1/2 hours early. Bring someone who can take you home after the test. You may not drive, take a bus or take a taxi by yourself.   No eating 8 hours before your exam. You may have clear liquids up until 4 hours before your exam. (Clear liquids include water, clear tea, black coffee and fruit juice without pulp.)   You will spend four to five hours in the recovery room.  Please call the Imaging Department at your exam site with any questions.              Future tests that were ordered for you today     Open Future Orders        Priority Expected Expires Ordered    MR Brain w/o & w Contrast Routine  9/13/2019 9/13/2018    MR Cervical Spine w/o Contrast Routine   9/13/2019 9/13/2018    EMG Routine  9/13/2019 9/13/2018            Who to contact     If you have questions or need follow up information about today's clinic visit or your schedule please contact [facility] directly at [phone].  Normal or non-critical lab and imaging results will be communicated to you by MyChart, letter or phone within 4 business days after the clinic has received the results. If you do not hear from us within 7 days, please contact the clinic through MyChart or phone. If you have a critical or abnormal lab result, we will notify you by phone as soon as possible.  Submit refill requests through [employer] or call your pharmacy and they will forward the refill request to us. Please allow 3 business days for your refill to be completed.          Additional Information About Your Visit        Care EveryWhere ID     This is your Care EveryWhere ID. This could be used by other organizations to access your [place] medical records  [id number]        Your Vitals Were     Pulse Height Pulse Oximetry BMI (Body Mass Index)          90 1.524 m (5') 96% 35.94 kg/m2         Blood Pressure from Last 3 Encounters:   09/13/18 (!) 155/112   09/12/18 131/88   12/22/16 130/82    Weight from Last 3 Encounters:   09/13/18 83.5 kg (184 lb)   09/12/18 84.4 kg (186 lb)   12/22/16 88 kg (194 lb)              We Performed the Following     Aldolase     Angiotensin converting enzyme     Anti Nuclear Karl IgG by IFA with Reflex     CBC with platelets differential     CK total     Comprehensive metabolic panel     Copper level     CRP, inflammation     ESR: Erythrocyte sedimentation rate     Lyme Disease Karl with reflex to WB Serum     M Tuberculosis by Quantiferon     Rheumatoid factor     Agudelo LUIS ANGEL Antibody IgG     SSA Ro LUIS ANGEL Antibody IgG     SSB La LUIS ANGEL Antibody IgG     Tissue transglutaminase karl IgA and IgG     Treponema Abs w Reflex to RPR and Titer     TSH with free T4 reflex     Varicella Zoster  Virus Antibody IgG     Vasculitis panel     Vitamin B12     Vitamin B6          Today's Medication Changes          These changes are accurate as of 9/13/18  3:29 PM.  If you have any questions, ask your nurse or doctor.               Start taking these medicines.        Dose/Directions    nortriptyline 25 MG capsule   Commonly known as:  PAMELOR   Used for:  Arm paresthesia, right   Started by:  Vinayak Agudelo MD        Dose:  25 mg   Take 1 capsule (25 mg) by mouth At Bedtime   Quantity:  30 capsule   Refills:  3            Where to get your medicines      These medications were sent to [facility] - [place], [place] - [address], [address]  [address], [address], [facility] [address]     Phone:  [phone]     nortriptyline 25 MG capsule               Information about OPIOIDS     PRESCRIPTION OPIOIDS: WHAT YOU NEED TO KNOW   We gave you an opioid (narcotic) pain medicine. It is important to manage your pain, but opioids are not always the best choice. You should first try all the other options your care team gave you. Take this medicine for as short a time (and as few doses) as possible.    Some activities can increase your pain, such as bandage changes or therapy sessions. It may help to take your pain medicine 30 to 60 minutes before these activities. Reduce your stress by getting enough sleep, working on hobbies you enjoy and practicing relaxation or meditation. Talk to your care team about ways to manage your pain beyond prescription opioids.    These medicines have risks:    DO NOT drive when on new or higher doses of pain medicine. These medicines can affect your alertness and reaction times, and you could be arrested for driving under the influence (DUI). If you need to use opioids long-term, talk to your care team about driving.    DO NOT operate heavy machinery    DO NOT do any other dangerous activities while taking these medicines.    DO NOT drink any  alcohol while taking these medicines.     If the opioid prescribed includes acetaminophen, DO NOT take with any other medicines that contain acetaminophen. Read all labels carefully. Look for the word  acetaminophen  or  Tylenol.  Ask your pharmacist if you have questions or are unsure.    You can get addicted to pain medicines, especially if you have a history of addiction (chemical, alcohol or substance dependence). Talk to your care team about ways to reduce this risk.    All opioids tend to cause constipation. Drink plenty of water and eat foods that have a lot of fiber, such as fruits, vegetables, prune juice, apple juice and high-fiber cereal. Take a laxative (Miralax, milk of magnesia, Colace, Senna) if you don t move your bowels at least every other day. Other side effects include upset stomach, sleepiness, dizziness, throwing up, tolerance (needing more of the medicine to have the same effect), physical dependence and slowed breathing.    Store your pills in a secure place, locked if possible. We will not replace any lost or stolen medicine. If you don t finish your medicine, please throw away (dispose) as directed by your pharmacist. The [place] [employer] has more information about safe disposal: [email or web address]         Primary Care Provider Office Phone # Fax #    Vinayak Huerta PA-C [phone] [id number] [facility] [id number]        Equal Access to Services     MARANDA SANCHEZ : Hadii aad ku hadasho Sodeannaali, waaxda luqadaha, qaybta kaalmada adereubenyada, master winston . So [facility] [phone].    ATENCIÓN: Si habla español, tiene a ghosh disposición servicios gratuitos de asistencia lingüística. Llame al [phone].    We comply with applicable federal civil rights laws and [place] laws. We do not discriminate on the basis of race, color, national origin, age, disability, sex,  sexual orientation, or gender identity.            Thank you!     Thank you for choosing [facility]  for your care. Our goal is always to provide you with excellent care. Hearing back from our patients is one way we can continue to improve our services. Please take a few minutes to complete the written survey that you may receive in the mail after your visit with us. Thank you!             Your Updated Medication List - Protect others around you: Learn how to safely use, store and throw away your medicines at www.disposemymeds.org.          This list is accurate as of 9/13/18  3:29 PM.  Always use your most recent med list.                   Brand Name Dispense Instructions for use Diagnosis    acetaminophen-codeine 300-30 MG per tablet    TYLENOL #3    16 tablet    Take 1 tablet by mouth every 6 hours as needed for severe pain    Right arm pain, Right arm weakness       CYCLOBENZAPRINE HCL PO      Take 10 mg by mouth 3 times daily        LISINOPRIL PO      Take 10 mg by mouth daily        metFORMIN 500 MG tablet    GLUCOPHAGE    180 tablet    Take 1 tablet (500 mg) by mouth 2 times daily (with meals)    Type 2 diabetes, HbA1c goal < 7% (H)       methocarbamol 750 MG tablet    ROBAXIN    60 tablet    Take 1 tablet (750 mg) by mouth 3 times daily as needed    Musculoskeletal disorder involving upper trapezius muscle       naproxen 500 MG tablet    NAPROSYN    30 tablet    Take 1 tablet (500 mg) by mouth 2 times daily (with meals)    Musculoskeletal disorder involving upper trapezius muscle       nortriptyline 25 MG capsule    PAMELOR    30 capsule    Take 1 capsule (25 mg) by mouth At Bedtime    Arm paresthesia, right       order for DME     200 each    LANCETS BID    Type 2 diabetes, HbA1c goal < 7% (H)       order for DME     200 each    TEST STRIPS BID.  BRAND PER INSURANCE.    Type 2 diabetes, HbA1c goal < 7% (H)       oxyCODONE-acetaminophen 5-325 MG per tablet    PERCOCET     Take 1 tablet by  mouth every 6 hours as needed for pain        paragard intrauterine copper     1 each    1 each by Intrauterine route once Placed in 2012        sulfamethoxazole-trimethoprim 800-160 MG per tablet    BACTRIM DS/SEPTRA DS    14 tablet    Take 1 tablet by mouth 2 times daily        triamcinolone 0.1 % cream    KENALOG    30 g    Apply sparingly to affected area three times daily for 14 days.    Dyshidrotic eczema

## 2018-09-13 NOTE — PROGRESS NOTES
THE [facility] MULTIPLE SCLEROSIS CLINIC  NEW PATIENT EVALUATION/CONSULTATION    Referral source:   Iqra  Christina POWER [address] LEI / [place] [address]      Also followed by:   Vinayak Huerta  [address]  [place] [address]      PRINCIPAL NEUROLOGIC DIAGNOSIS: Multiple Sclerosis    DISEASE SUMMARY  Date of onset: 9/2018  Date of diagnosis of MS: NA  Disease course at onset: Relapsing Remitting  Current disease course: Relapsing Remitting  Previous disease therapies: NA  Current disease therapy: NA  Most recent MRI brain: NA  Most recent MRI cervical spine: NA  CSF: NA  JCV serology result and date: NA      HISTORY OF ILLNESS:    An opinion on this year old right handed genetic female  was requested by Dr. Jackelyn Escalona for evaluation of abnormal sensation in the arm. The patient was unaccompanied. Previous records (physician notes, laboratory reports, and radiology reports) and imaging studies were reviewed and summarized. My recommendations will be communicated back to the patient's physician(s) by mail.  Follow-up is expected to be with the referring physician.      The patient reports that her symptoms start one week ago. The pain started in her head and spread down her entire arm. There was no inciting event, injury or illness. The pains spread down her arm two to three days. The pain started on Thursday and gradually worsened until she had to present to the Emergency deparment room because of the pain.  In the ED, she got pain medication that only lead to a temporally. The patient went to a urgent care and she was told that she had a nerve issues and was referred here.    The patient denies ever having symptoms like this in the past.     Current Symptoms:  1. Arm paraesthesia. The arm reportedly feels like a cold pain in her entire arm. There is a trobbing pain in her shoulder and upper arm. She reports that moving her arm makes it worse. It is also worse if she pinches her arm. Holding  her arm made it better. The pain is a 10/10 in severity. She can not think that anything else makes it better or worse. The pain is mildly improved today when compared to yesterday.  The pain is constantly there. The patient reports that it does not radiate or travel.  2.  3.        PAST HISTORY:  No past medical history on file.    No past surgical history on file.           Current Outpatient Prescriptions:  Current Outpatient Prescriptions   Medication     acetaminophen-codeine (TYLENOL #3) 300-30 MG per tablet     CYCLOBENZAPRINE HCL PO     LISINOPRIL PO     metFORMIN (GLUCOPHAGE) 500 MG tablet     methocarbamol (ROBAXIN) 750 MG tablet     naproxen (NAPROSYN) 500 MG tablet     oxyCODONE-acetaminophen (PERCOCET) 5-325 MG per tablet     paragard intrauterine copper     sulfamethoxazole-trimethoprim (BACTRIM DS/SEPTRA DS) 800-160 MG per tablet     triamcinolone (KENALOG) 0.1 % cream     ORDER FOR DME     ORDER FOR DME     No current facility-administered medications for this visit.           ALLERGIES     No Known Allergies      Social History    Social History     Social History     Marital status: Legally      Spouse name: N/A     Number of children: N/A     Years of education: N/A     Occupational History     Not on file.     Social History Main Topics     Smoking status: Never Smoker     Smokeless tobacco: Never Used     Alcohol use No     Drug use: No     Sexual activity: Yes     Partners: Male     Other Topics Concern     Not on file     Social History Narrative         FAMILY HISTORY     No family history on file.      REVIEW OF SYSTEMS:    Patient had nausea 2 months    Comprehensive review of systems otherwise was negative, including constitutional, head and neck, cardiovascular, pulmonary, gastrointestinal, endocrine, urologic, reproductive, rheumatic, hematologic, immunologic, dermatologic, and psychiatric.    Nutritional concerns: None  Driving issues: None   Safety concerns regarding living  situations and safety at home: None  Risk of falls: None  Pain: None    PHYSICAL EXAM:    Hair, skin, nails, and joints were normal. Neck was supple without Lhermitte's phenomenon.  There was no percussion tenderness over the spine.     The patient was alert and oriented to person, place, and time with normal language, attention and concentration, recent and remote memory, praxis, and intellectual function. Affect was normal. The patient did not appear depressed.    Visual acuity:  OD 20/20  OS 20/20    Correction: without    Visual fields were full to confrontation.   Pupils were 4 mm and briskly reactive OU without a relative afferent pupillary defect.  Funduscopic examination was normal without disc edema, erythema, or atrophy.  Extraocular movements: Intact without MANOLO  Facial sensation is normal. Normal strength of the muscles of mastication:   Muscles of facial expression were normal  Hearing was normal. Gag reflex and palatal movements were normal. Sternocleidomastoid and trapezius power were normal. Tongue movements were normal. There was no dysarthria.    Motor Examination:   The patient gave poor effort and the exam was complicated by giveaway weakness       Motor    Upper      Right Left   Shoulder Abduction 4 5   Elbow Flexion 5 5   Elbow Extension 5 5   Wrist Extension 5 5   Digit Extension 5 5   Digit Flexion 5 5   APB 5 5   Tone 0 0   Lower       Right Left   Hip Flexion 5 5   Knee Extension 5 5   Knee Flexion 5 5   Foot Dorsiflexion 5 5   Foot Plantar Flexion 5 5   EH 5 5   Toe Flexion 5 5   Tone 0 0           Grade Description   0 No increase in muscle tone   1 Slight increase in muscle tone, manifested by a catch and release or by minimal resistance at the end of the range of motion when the affected part(s) is moved in flexion or extension   1+ Slight increase in muscle tone, manifested by a catch, followed by minimal resistance throughout the remainder (less than half) of the ROM   2 More marked  increase in muscle tone through most of the ROM, but affected part(s) easily moved   3 Considerable increase in muscle tone, passive movement difficult   4 Affected part(s) rigid in flexion or extension             Reflexes:     Reflexes       Right  Left   Biceps 1  1   Triceps 1  1   Brachioradialis 1  1   Patellar  1  1   Achilles 1  1   Babinski mute  mute         Coordination:     Right Left   RRM mildly impaired Normal   HOMER mildly impaired Normal   FTN mildly impaired Normal   RRM Normal Normal   HKS Normal Normal         Sensory examination:    Light touch:  Intact in all extremities, Pin Prick:  Intact in all extremities, Vibration:   Intact in all extremities, Prioperception:  Intact in all extremities and Temperature:  Intact in all extremities      Coordination and Gait        Gait Normal   Right Left   Romberg Normal  Heel Normal Normal   Tandem {Normal  Toe Normal Normal               REVIEW OF OUTSIDE RECORDS:    Summarized above     REVIEW OF IMAGING STUDIES:    I personally reviewed the following images:    NA    ASSESSMENT:    The patient is a 42-year-old female with a past medical history of diabetes, hypertension, and hyperlipidemia who is presenting as a consult with the question of whether or not the patient's arm pain could be caused by a potential nervous system disease.  The patient's neurologic exam essentially appears to be normal except for the noted tenderness of her right arm with significant giveaway weakness and reluctance to do any activities with it.  The pain seems to be more located over the muscles and the shoulder joint proper then any anatomical distribution.  Theoretically, the patient could have multiple sclerosis.  Therefore, I will get an MRI of her cervical spine and brain to potentially that disease out.  Admittedly, this presentation of multiple sclerosis would be very atypical.  Moreover, this could represent potential neuropathic process.  I will have the patient get  an EMG a month from now to help this possibility.  Theoretically it could be diabetic amyotrophy, however this would also be a rare diagnosis.  I will also check screening blood work to evaluate her for autoimmune conditions.  Depending on the results of this blood work, the patient will either follow-up with me, referring physician, or an orthopedist.  In the meantime to try to treat her pain I started the patient on Elavil.  I explained to the patient the risk and benefits of this medication.  I instructed her to call me in a month to tell me how this goes.  I told the patient she has any questions in the meantime to call my office.    PLAN:    MRI of the cervical spine and brain  CBC, CMP, ACE, ELDA, ESR, CRP, CK, aldolase,  EMG in 1 month  Start Pamelor 25 mg p.o. at bedtime    Finally I will follow the patient up in as need basis as long as the patient is doing well. I instructed the patient to call or mychart my office with any concerns or questions.    I spent 60 minutes in this visit, with >50% direct patient time spent counseling about prognosis, treatment options, and coordination of care.    Vinayak Agudelo MD [facility]  Staff Neurologist   09/13/18       (Chart documentation was completed in part with Dragon voice-recognition software. Even though reviewed, some grammatical, spelling, and word errors may remain.)

## 2018-09-13 NOTE — LETTER
9/13/2018         RE: Paige Davis  [address]  [place] [address]        Dear Colleague,    Thank you for referring your patient, Paige Davis, to the [facility]. Please see a copy of my visit note below.    THE [facility] MULTIPLE SCLEROSIS CLINIC  NEW PATIENT EVALUATION/CONSULTATION    Referral source:   Iqra STEVE TANIKA LEI / [place] [address]      Also followed by:   Vinayak Huerta  [address] [facility] [address]      PRINCIPAL NEUROLOGIC DIAGNOSIS: Multiple Sclerosis    DISEASE SUMMARY  Date of onset: 9/2018  Date of diagnosis of MS: NA  Disease course at onset: Relapsing Remitting  Current disease course: Relapsing Remitting  Previous disease therapies: NA  Current disease therapy: NA  Most recent MRI brain: NA  Most recent MRI cervical spine: NA  CSF: NA  JCV serology result and date: NA      HISTORY OF ILLNESS:    An opinion on this year old right handed genetic female  was requested by Dr. Jackelyn Escalona for evaluation of abnormal sensation in the arm. The patient was unaccompanied. Previous records (physician notes, laboratory reports, and radiology reports) and imaging studies were reviewed and summarized. My recommendations will be communicated back to the patient's physician(s) by mail.  Follow-up is expected to be with the referring physician.      The patient reports that her symptoms start one week ago. The pain started in her head and spread down her entire arm. There was no inciting event, injury or illness. The pains spread down her arm two to three days. The pain started on Thursday and gradually worsened until she had to present to the Emergency deparment room because of the pain.  In the ED, she got pain medication that only lead to a temporally. The patient went to a urgent care and she was told that she had a nerve issues and was referred here.    The patient denies ever having symptoms like this in the  past.     Current Symptoms:  1. Arm paraesthesia. The arm reportedly feels like a cold pain in her entire arm. There is a trobbing pain in her shoulder and upper arm. She reports that moving her arm makes it worse. It is also worse if she pinches her arm. Holding her arm made it better. The pain is a 10/10 in severity. She can not think that anything else makes it better or worse. The pain is mildly improved today when compared to yesterday.  The pain is constantly there. The patient reports that it does not radiate or travel.  2.  3.        PAST HISTORY:  No past medical history on file.    No past surgical history on file.           Current Outpatient Prescriptions:  Current Outpatient Prescriptions   Medication     acetaminophen-codeine (TYLENOL #3) 300-30 MG per tablet     CYCLOBENZAPRINE HCL PO     LISINOPRIL PO     metFORMIN (GLUCOPHAGE) 500 MG tablet     methocarbamol (ROBAXIN) 750 MG tablet     naproxen (NAPROSYN) 500 MG tablet     oxyCODONE-acetaminophen (PERCOCET) 5-325 MG per tablet     paragard intrauterine copper     sulfamethoxazole-trimethoprim (BACTRIM DS/SEPTRA DS) 800-160 MG per tablet     triamcinolone (KENALOG) 0.1 % cream     ORDER FOR DME     ORDER FOR DME     No current facility-administered medications for this visit.           ALLERGIES     No Known Allergies      Social History    Social History     Social History     Marital status: Legally      Spouse name: N/A     Number of children: N/A     Years of education: N/A     Occupational History     Not on file.     Social History Main Topics     Smoking status: Never Smoker     Smokeless tobacco: Never Used     Alcohol use No     Drug use: No     Sexual activity: Yes     Partners: Male     Other Topics Concern     Not on file     Social History Narrative         FAMILY HISTORY     No family history on file.      REVIEW OF SYSTEMS:    Patient had nausea 2 months    Comprehensive review of systems otherwise was negative, including  constitutional, head and neck, cardiovascular, pulmonary, gastrointestinal, endocrine, urologic, reproductive, rheumatic, hematologic, immunologic, dermatologic, and psychiatric.    Nutritional concerns: None  Driving issues: None   Safety concerns regarding living situations and safety at home: None  Risk of falls: None  Pain: None    PHYSICAL EXAM:    Hair, skin, nails, and joints were normal. Neck was supple without Lhermitte's phenomenon.  There was no percussion tenderness over the spine.     The patient was alert and oriented to person, place, and time with normal language, attention and concentration, recent and remote memory, praxis, and intellectual function. Affect was normal. The patient did not appear depressed.    Visual acuity:  OD 20/20  OS 20/20    Correction: without    Visual fields were full to confrontation.   Pupils were 4 mm and briskly reactive OU without a relative afferent pupillary defect.  Funduscopic examination was normal without disc edema, erythema, or atrophy.  Extraocular movements: Intact without MANOLO  Facial sensation is normal. Normal strength of the muscles of mastication:   Muscles of facial expression were normal  Hearing was normal. Gag reflex and palatal movements were normal. Sternocleidomastoid and trapezius power were normal. Tongue movements were normal. There was no dysarthria.    Motor Examination:   The patient gave poor effort and the exam was complicated by giveaway weakness       Motor    Upper      Right Left   Shoulder Abduction 4 5   Elbow Flexion 5 5   Elbow Extension 5 5   Wrist Extension 5 5   Digit Extension 5 5   Digit Flexion 5 5   APB 5 5   Tone 0 0   Lower       Right Left   Hip Flexion 5 5   Knee Extension 5 5   Knee Flexion 5 5   Foot Dorsiflexion 5 5   Foot Plantar Flexion 5 5   EH 5 5   Toe Flexion 5 5   Tone 0 0           Grade Description   0 No increase in muscle tone   1 Slight increase in muscle tone, manifested by a catch and release or by  minimal resistance at the end of the range of motion when the affected part(s) is moved in flexion or extension   1+ Slight increase in muscle tone, manifested by a catch, followed by minimal resistance throughout the remainder (less than half) of the ROM   2 More marked increase in muscle tone through most of the ROM, but affected part(s) easily moved   3 Considerable increase in muscle tone, passive movement difficult   4 Affected part(s) rigid in flexion or extension             Reflexes:     Reflexes       Right  Left   Biceps 1  1   Triceps 1  1   Brachioradialis 1  1   Patellar  1  1   Achilles 1  1   Babinski mute  mute         Coordination:     Right Left   RRM mildly impaired Normal   HOMER mildly impaired Normal   FTN mildly impaired Normal   RRM Normal Normal   HKS Normal Normal         Sensory examination:    Light touch:  Intact in all extremities, Pin Prick:  Intact in all extremities, Vibration:   Intact in all extremities, Prioperception:  Intact in all extremities and Temperature:  Intact in all extremities      Coordination and Gait        Gait Normal   Right Left   Romberg Normal  Heel Normal Normal   Tandem {Normal  Toe Normal Normal               REVIEW OF OUTSIDE RECORDS:    Summarized above     REVIEW OF IMAGING STUDIES:    I personally reviewed the following images:    NA    ASSESSMENT:    The patient is a 42-year-old female with a past medical history of diabetes, hypertension, and hyperlipidemia who is presenting as a consult with the question of whether or not the patient's arm pain could be caused by a potential nervous system disease.  The patient's neurologic exam essentially appears to be normal except for the noted tenderness of her right arm with significant giveaway weakness and reluctance to do any activities with it.  The pain seems to be more located over the muscles and the shoulder joint proper then any anatomical distribution.  Theoretically, the patient could have multiple  sclerosis.  Therefore, I will get an MRI of her cervical spine and brain to potentially that disease out.  Admittedly, this presentation of multiple sclerosis would be very atypical.  Moreover, this could represent potential neuropathic process.  I will have the patient get an EMG a month from now to help this possibility.  Theoretically it could be diabetic amyotrophy, however this would also be a rare diagnosis.  I will also check screening blood work to evaluate her for autoimmune conditions.  Depending on the results of this blood work, the patient will either follow-up with me, referring physician, or an orthopedist.  In the meantime to try to treat her pain I started the patient on Elavil.  I explained to the patient the risk and benefits of this medication.  I instructed her to call me in a month to tell me how this goes.  I told the patient she has any questions in the meantime to call my office.    PLAN:    MRI of the cervical spine and brain  CBC, CMP, ACE, ELDA, ESR, CRP, CK, aldolase,  EMG in 1 month  Start Pamelor 25 mg p.o. at bedtime    Finally I will follow the patient up in as need basis as long as the patient is doing well. I instructed the patient to call or mychart my office with any concerns or questions.    I spent 60 minutes in this visit, with >50% direct patient time spent counseling about prognosis, treatment options, and coordination of care.    Vinayak Agudelo MD [facility]  Staff Neurologist   09/13/18       (Chart documentation was completed in part with Dragon voice-recognition software. Even though reviewed, some grammatical, spelling, and word errors may remain.)         Again, thank you for allowing me to participate in the care of your patient.        Sincerely,        Vinayak Agudelo MD

## 2018-09-14 ENCOUNTER — OFFICE VISIT (OUTPATIENT)
Dept: NEUROLOGY | Facility: CLINIC | Age: 43
End: 2018-09-14
Attending: PSYCHIATRY & NEUROLOGY
Payer: COMMERCIAL

## 2018-09-14 DIAGNOSIS — E66.01 MORBID OBESITY (H): ICD-10-CM

## 2018-09-14 DIAGNOSIS — R20.2 ARM PARESTHESIA, RIGHT: ICD-10-CM

## 2018-09-14 DIAGNOSIS — R79.9 ABNORMAL BLOOD FINDING: Primary | ICD-10-CM

## 2018-09-14 LAB
B BURGDOR IGG+IGM SER QL: 0.1 (ref 0–0.89)
ENA SM IGG SER-ACNC: <0.2 AI (ref 0–0.9)
ENA SS-A IGG SER IA-ACNC: >8 AI (ref 0–0.9)
ENA SS-B IGG SER IA-ACNC: <0.2 AI (ref 0–0.9)
RHEUMATOID FACT SER NEPH-ACNC: <20 IU/ML (ref 0–20)
TTG IGA SER-ACNC: 1 U/ML
TTG IGG SER-ACNC: <1 U/ML

## 2018-09-14 PROCEDURE — 95886 MUSC TEST DONE W/N TEST COMP: CPT | Performed by: PSYCHIATRY & NEUROLOGY

## 2018-09-14 PROCEDURE — 95909 NRV CNDJ TST 5-6 STUDIES: CPT | Performed by: PSYCHIATRY & NEUROLOGY

## 2018-09-14 NOTE — LETTER
9/14/2018         RE: Paige Davis  [address]  [place] [address]        Dear Colleague,    Thank you for referring your patient, Paige Davis, to the [facility]. Please see a copy of my visit note below.    [facility] EMG Laboratory      Nerve Conduction & EMG Report          Patient:       Paige Davis  Patient ID:    [id number]  Gender:        Female  YOB: 1975  Age:           42 Years 9 Months      History and Examination:  Paige Davis is a 42 year old woman with diabetes and a one week history of pain in the right upper limb. Examination demonstrates full strength. She is referred for further evaluation of possible neurogenic etiologies.     Techniques:  Motor conduction studies were done with surface recording electrodes. Sensory conduction studies were performed with surface electrodes, unless indicated otherwise by (n), designating the use of subdermal recording electrodes. Temperature was monitored and recorded throughout the study. Upper extremities were maintained at a temperature of 32 degrees Centigrade or higher.  EMG was done with a concentric needle electrode.     Results:  Right median, ulnar, radial, and medial antebrachial cutaneous sensory conduction studies were normal. Right median and ulnar motor conduction studies were normal. Electromyography was normal.     Interpretation:  This is a normal study. In particular, there is no electrophysiologic evidence of polyneuropathy, radiculopathy, or entrapment neuropathy. See comment.    Comment:  Symptoms have been present for only one week. Note that sensory nerve action potentials and compound muscle action potentials after distal stimulation can be preserved for several days after axonal injury, and fibrillation potentials do not develop until 2-3 weeks after axonal injury. That said, significant motor axon loss is usually associated  with reduced recruitment immediately after axonal injury, and no definite abnormalities of recruitment were noted in any muscle studied. The EMG was limited somewhat due to discomfort, but all roots, trunks, and cords were sampled. Pain from radiculopathy without motor axon loss is not excluded by a negative electrodiagnostic study.     Javon Suarez MD        Sensory NCS      Nerve / Sites Rec. Site Onset Peak Ref. NP Amp Ref. PP Amp Dist Lemuel Ref. Temp     ms ms ms  V  V  V cm m/s m/s  C   R MEDIAN - Dig II      Dig II Wrist 2.34 2.92  21.1 10.0 29.3 14 59.7 48.0 33.9   R ULNAR - Dig V      Dig V Wrist 1.88 2.45  15.7 8.0 18.9 12.5 66.7 48.0 33.9   R RADIAL - Snuff      Forearm Snuff 1.93 2.40  33.7 15.0 44.5 12.5 64.9 48.0 32   R MED CUT N FORE      Elbow Forearm 1.77 2.14 3.30 7.2  7.4 12 67.8  31.9       Motor NCS      Nerve / Sites Rec. Site Lat Ref. Amp Ref. Rel Amp Dist Lemuel Ref. Dur. Area Temp.     ms ms mV mV % cm m/s m/s ms %  C   R MEDIAN - APB      Wrist APB 2.97 4.40 8.4 5.0 100 8   6.77 100 34.5      Elbow APB 6.61  8.4  100 20.5 56.2 48.0 6.88 100 34.5   R ULNAR - ADM      Wrist ADM 2.29 3.50 11.9 5.0 100 8   7.08 100 34.1      B.Elbow ADM 5.26  11.6  97.2 18 60.6 48.0 7.19 100 34.3      A.Elbow ADM 6.93  11.6  97 10 60.0 48.0 7.08 99.3 34.1       EMG Summary Table     Spontaneous MUAP Recruitment    IA Fib PSW Fasc H.F. Amp Dur. PPP Pattern   R. PRON TERES N None None None None N N N N   R. EXT DIG COMM N None None None None N N N N   R. DELTOID N None None None None N N N N   R. FIRST D INTEROSS N None None None None N N N N   R. BICEPS N None None None None N N N N   R. C7 PSP N None None None None N N N N                        Again, thank you for allowing me to participate in the care of your patient.        Sincerely,        Javon Suarez MD

## 2018-09-14 NOTE — PROGRESS NOTES
[facility] EMG Laboratory      Nerve Conduction & EMG Report          Patient:       Paige Davis  Patient ID:    [id number]  Gender:        Female  YOB: 1975  Age:           42 Years 9 Months      History and Examination:  Paige Davis is a 42 year old woman with diabetes and a one week history of pain in the right upper limb. Examination demonstrates full strength. She is referred for further evaluation of possible neurogenic etiologies.     Techniques:  Motor conduction studies were done with surface recording electrodes. Sensory conduction studies were performed with surface electrodes, unless indicated otherwise by (n), designating the use of subdermal recording electrodes. Temperature was monitored and recorded throughout the study. Upper extremities were maintained at a temperature of 32 degrees Centigrade or higher.  EMG was done with a concentric needle electrode.     Results:  Right median, ulnar, radial, and medial antebrachial cutaneous sensory conduction studies were normal. Right median and ulnar motor conduction studies were normal. Electromyography was normal.     Interpretation:  This is a normal study. In particular, there is no electrophysiologic evidence of polyneuropathy, radiculopathy, or entrapment neuropathy. See comment.    Comment:  Symptoms have been present for only one week. Note that sensory nerve action potentials and compound muscle action potentials after distal stimulation can be preserved for several days after axonal injury, and fibrillation potentials do not develop until 2-3 weeks after axonal injury. That said, significant motor axon loss is usually associated with reduced recruitment immediately after axonal injury, and no definite abnormalities of recruitment were noted in any muscle studied. The EMG was limited somewhat due to discomfort, but all roots, trunks, and cords were sampled. Pain from radiculopathy without  motor axon loss is not excluded by a negative electrodiagnostic study.     Javon Suarez MD        Sensory NCS      Nerve / Sites Rec. Site Onset Peak Ref. NP Amp Ref. PP Amp Dist Lemuel Ref. Temp     ms ms ms  V  V  V cm m/s m/s  C   R MEDIAN - Dig II      Dig II Wrist 2.34 2.92  21.1 10.0 29.3 14 59.7 48.0 33.9   R ULNAR - Dig V      Dig V Wrist 1.88 2.45  15.7 8.0 18.9 12.5 66.7 48.0 33.9   R RADIAL - Snuff      Forearm Snuff 1.93 2.40  33.7 15.0 44.5 12.5 64.9 48.0 32   R MED CUT N FORE      Elbow Forearm 1.77 2.14 3.30 7.2  7.4 12 67.8  31.9       Motor NCS      Nerve / Sites Rec. Site Lat Ref. Amp Ref. Rel Amp Dist Lemuel Ref. Dur. Area Temp.     ms ms mV mV % cm m/s m/s ms %  C   R MEDIAN - APB      Wrist APB 2.97 4.40 8.4 5.0 100 8   6.77 100 34.5      Elbow APB 6.61  8.4  100 20.5 56.2 48.0 6.88 100 34.5   R ULNAR - ADM      Wrist ADM 2.29 3.50 11.9 5.0 100 8   7.08 100 34.1      B.Elbow ADM 5.26  11.6  97.2 18 60.6 48.0 7.19 100 34.3      A.Elbow ADM 6.93  11.6  97 10 60.0 48.0 7.08 99.3 34.1       EMG Summary Table     Spontaneous MUAP Recruitment    IA Fib PSW Fasc H.F. Amp Dur. PPP Pattern   R. PRON TERES N None None None None N N N N   R. EXT DIG COMM N None None None None N N N N   R. DELTOID N None None None None N N N N   R. FIRST D INTEROSS N None None None None N N N N   R. BICEPS N None None None None N N N N   R. C7 PSP N None None None None N N N N

## 2018-09-14 NOTE — MR AVS SNAPSHOT
After Visit Summary   9/14/2018    Paige Davis    MRN: [id number]           Patient Information     Date Of Birth          1975        Visit Information        Provider Department      9/14/2018 1:45 PM Javon Suarez MD; [employer]; PROC RM 1 PEDS [facility]        Today's Diagnoses     Arm paresthesia, right        Morbid obesity (H)           Follow-ups after your visit        Your next 10 appointments already scheduled     Sep 24, 2018  8:00 AM CDT   MR CERVICAL SPINE W/O CONTRAST with MGMR1   [facility] ([facility])    [address] [place] [address] [phone]           Take your medicines as usual, unless your doctor tells you not to. Bring a list of your current medicines to your exam (including vitamins, minerals and over-the-counter drugs). Also bring the results of similar scans you may have had.  Please remove any body piercings and hair extensions before you arrive.  Follow your doctor s orders. If you do not, we may have to postpone your exam.  You may or may not receive IV contrast for this exam pending the discretion of the Radiologist.  You do not need to do anything special to prepare.  The MRI machine uses a strong magnet. Please wear clothes without metal (snaps, zippers). A sweatsuit works well, or we may give you a hospital gown.   **IMPORTANT** THE INSTRUCTIONS BELOW ARE ONLY FOR THOSE PATIENTS WHO HAVE BEEN PRESCRIBED SEDATION OR GENERAL ANESTHESIA DURING THEIR MRI PROCEDURE:  IF YOUR DOCTOR PRESCRIBED ORAL SEDATION (take medicine to help you relax during your exam):   You must get the medicine from your doctor (oral medication) before you arrive. Bring the medicine to the exam. Do not take it at home. You ll be told when to take it upon arriving for your exam.   Arrive one hour early. Bring someone who can take you home after the test. Your medicine will make you sleepy.  After the exam, you may not drive, take a bus or take a taxi by yourself.  IF YOUR DOCTOR PRESCRIBED IV SEDATION:   Arrive one hour early. Bring someone who can take you home after the test. Your medicine will make you sleepy. After the exam, you may not drive, take a bus or take a taxi by yourself.   No eating 6 hours before your exam. You may have clear liquids up until 4 hours before your exam. (Clear liquids include water, clear tea, black coffee and fruit juice without pulp.)  IF YOUR DOCTOR PRESCRIBED ANESTHESIA (be asleep for your exam):   Arrive 1 1/2 hours early. Bring someone who can take you home after the test. You may not drive, take a bus or take a taxi by yourself.   No eating 8 hours before your exam. You may have clear liquids up until 4 hours before your exam. (Clear liquids include water, clear tea, black coffee and fruit juice without pulp.)   You will spend four to five hours in the recovery room.  Please call the Imaging Department at your exam site with any questions.            Sep 24, 2018  8:45 AM CDT   MR BRAIN W/O & W CONTRAST with MGMR1   [facility] ([facility])    [address] [address] [place] [address] [phone]           Take your medicines as usual, unless your doctor tells you not to. Bring a list of your current medicines to your exam (including vitamins, minerals and over-the-counter drugs).  You may or may not receive intravenous (IV) contrast for this exam pending the discretion of the Radiologist.  You do not need to do anything special to prepare.  The MRI machine uses a strong magnet. Please wear clothes without metal (snaps, zippers). A sweatsuit works well, or we may give you a hospital gown.  Please remove any body piercings and hair extensions before you arrive. You will also remove watches, jewelry, hairpins, wallets, dentures, partial dental plates and hearing aids. You may wear contact lenses, and you may be able to wear  your rings. We have a safe place to keep your personal items, but it is safer to leave them at home.  **IMPORTANT** THE INSTRUCTIONS BELOW ARE ONLY FOR THOSE PATIENTS WHO HAVE BEEN PRESCRIBED SEDATION OR GENERAL ANESTHESIA DURING THEIR MRI PROCEDURE:  IF YOUR DOCTOR PRESCRIBED ORAL SEDATION (take medicine to help you relax during your exam):   You must get the medicine from your doctor (oral medication) before you arrive. Bring the medicine to the exam. Do not take it at home. You ll be told when to take it upon arriving for your exam.   Arrive one hour early. Bring someone who can take you home after the test. Your medicine will make you sleepy. After the exam, you may not drive, take a bus or take a taxi by yourself.  IF YOUR DOCTOR PRESCRIBED IV SEDATION:   Arrive one hour early. Bring someone who can take you home after the test. Your medicine will make you sleepy. After the exam, you may not drive, take a bus or take a taxi by yourself.   No eating 6 hours before your exam. You may have clear liquids up until 4 hours before your exam. (Clear liquids include water, clear tea, black coffee and fruit juice without pulp.)  IF YOUR DOCTOR PRESCRIBED ANESTHESIA (be asleep for your exam):   Arrive 1 1/2 hours early. Bring someone who can take you home after the test. You may not drive, take a bus or take a taxi by yourself.   No eating 8 hours before your exam. You may have clear liquids up until 4 hours before your exam. (Clear liquids include water, clear tea, black coffee and fruit juice without pulp.)   You will spend four to five hours in the recovery room.  Please call the Imaging Department at your exam site with any questions.              Future tests that were ordered for you today     Open Future Orders        Priority Expected Expires Ordered    Needle EMG Each Extremity w/Paraspinal Area Complete (98667) Routine  9/14/2019 9/14/2018    MR Brain w/o & w Contrast Routine  9/13/2019 9/13/2018    MR Cervical  Spine w/o Contrast Routine  9/13/2019 9/13/2018            Who to contact     If you have questions or need follow up information about today's clinic visit or your schedule please contact [facility] directly at [phone].  Normal or non-critical lab and imaging results will be communicated to you by MyChart, letter or phone within 4 business days after the clinic has received the results. If you do not hear from us within 7 days, please contact the clinic through MyChart or phone. If you have a critical or abnormal lab result, we will notify you by phone as soon as possible.  Submit refill requests through [employer] or call your pharmacy and they will forward the refill request to us. Please allow 3 business days for your refill to be completed.          Additional Information About Your Visit        Care EveryWhere ID     This is your Care EveryWhere ID. This could be used by other organizations to access your [place] medical records  [id number]         Blood Pressure from Last 3 Encounters:   09/13/18 (!) 155/112   09/12/18 131/88   12/22/16 130/82    Weight from Last 3 Encounters:   09/13/18 83.5 kg (184 lb)   09/12/18 84.4 kg (186 lb)   12/22/16 88 kg (194 lb)              We Performed the Following     EMG     NCS Motor with or without F-Wave, 5-6 nerves (26088)        Primary Care Provider Office Phone # Fax #    Vinayak Huerta PA-C [phone] [address] [facility] [address]        Equal Access to Services     MARY SANCHEZ : Hadii aad ku hadasho Soomaali, waaxda luqadaha, qaybta kaalmada adeegyada, master quinonez. So [facility] [phone].    ATENCIÓN: Si christinala randy, tiene a ghosh disposición servicios gratuitos de asistencia lingüística. Llame al [phone].    We comply with applicable federal civil rights laws and [place] laws. We do not discriminate on the basis of race, color, national origin, age, disability, sex, sexual  orientation, or gender identity.            Thank you!     Thank you for choosing [facility]  for your care. Our goal is always to provide you with excellent care. Hearing back from our patients is one way we can continue to improve our services. Please take a few minutes to complete the written survey that you may receive in the mail after your visit with us. Thank you!             Your Updated Medication List - Protect others around you: Learn how to safely use, store and throw away your medicines at www.disposemymeds.org.          This list is accurate as of 9/14/18  6:21 PM.  Always use your most recent med list.                   Brand Name Dispense Instructions for use Diagnosis    acetaminophen-codeine 300-30 MG per tablet    TYLENOL #3    16 tablet    Take 1 tablet by mouth every 6 hours as needed for severe pain    Right arm pain, Right arm weakness       CYCLOBENZAPRINE HCL PO      Take 10 mg by mouth 3 times daily        LISINOPRIL PO      Take 10 mg by mouth daily        metFORMIN 500 MG tablet    GLUCOPHAGE    180 tablet    Take 1 tablet (500 mg) by mouth 2 times daily (with meals)    Type 2 diabetes, HbA1c goal < 7% (H)       methocarbamol 750 MG tablet    ROBAXIN    60 tablet    Take 1 tablet (750 mg) by mouth 3 times daily as needed    Musculoskeletal disorder involving upper trapezius muscle       naproxen 500 MG tablet    NAPROSYN    30 tablet    Take 1 tablet (500 mg) by mouth 2 times daily (with meals)    Musculoskeletal disorder involving upper trapezius muscle       nortriptyline 25 MG capsule    PAMELOR    30 capsule    Take 1 capsule (25 mg) by mouth At Bedtime    Arm paresthesia, right       order for DME     200 each    LANCETS BID    Type 2 diabetes, HbA1c goal < 7% (H)       order for DME     200 each    TEST STRIPS BID.  BRAND PER INSURANCE.    Type 2 diabetes, HbA1c goal < 7% (H)       oxyCODONE-acetaminophen 5-325 MG per tablet    PERCOCET     Take 1 tablet by mouth  every 6 hours as needed for pain        paragard intrauterine copper     1 each    1 each by Intrauterine route once Placed in 2012        sulfamethoxazole-trimethoprim 800-160 MG per tablet    BACTRIM DS/SEPTRA DS    14 tablet    Take 1 tablet by mouth 2 times daily        triamcinolone 0.1 % cream    KENALOG    30 g    Apply sparingly to affected area three times daily for 14 days.    Dyshidrotic eczema

## 2018-09-15 LAB
ACE SERPL-CCNC: 14 U/L (ref 9–67)
ALDOLASE SERPL-CCNC: 5.9 U/L (ref 1.5–8.1)

## 2018-09-16 LAB
MYELOPEROXIDASE AB SER-ACNC: <0.2 AI (ref 0–0.9)
PROTEINASE3 IGG SER-ACNC: <0.2 AI (ref 0–0.9)
T PALLIDUM AB SER QL: NONREACTIVE
VZV IGG SER QL IA: 1.6 AI (ref 0–0.8)

## 2018-09-17 ENCOUNTER — TELEPHONE (OUTPATIENT)
Dept: NEUROLOGY | Facility: CLINIC | Age: 43
End: 2018-09-17

## 2018-09-17 LAB
COPPER SERPL-MCNC: 138 UG/DL (ref 80–155)
GAMMA INTERFERON BACKGROUND BLD IA-ACNC: 0.04 IU/ML
M TB IFN-G BLD-IMP: NEGATIVE
M TB IFN-G CD4+ BCKGRND COR BLD-ACNC: >10 IU/ML
MITOGEN IGNF BCKGRD COR BLD-ACNC: 0 IU/ML
MITOGEN IGNF BCKGRD COR BLD-ACNC: 0 IU/ML
VIT B6 SERPL-MCNC: 11 NMOL/L (ref 20–125)

## 2018-09-17 NOTE — TELEPHONE ENCOUNTER
Can you call the patient and help her schedule an appointment with rheumatology. It looks like the patient may have a mixed connective tissue disorder/ Sjogren's Syndrome.     Called pt with  and explained her results; assisted her with getting transferred to the call center while the  was on the phone so she could get scheduled with Rheumatology. Juana Neely RN

## 2018-09-18 ENCOUNTER — TELEPHONE (OUTPATIENT)
Dept: NEUROLOGY | Facility: CLINIC | Age: 43
End: 2018-09-18

## 2018-09-18 DIAGNOSIS — E53.1 VITAMIN B6 DEFICIENCY: Primary | ICD-10-CM

## 2018-09-18 LAB
ANA PAT SER IF-IMP: ABNORMAL
ANA SER QL IF: POSITIVE
ANA TITR SER IF: ABNORMAL {TITER}

## 2018-09-18 RX ORDER — LANOLIN ALCOHOL/MO/W.PET/CERES
50 CREAM (GRAM) TOPICAL DAILY
COMMUNITY
Start: 2018-09-18

## 2018-09-18 NOTE — TELEPHONE ENCOUNTER
The pt was informed of her lab results via . She verbalized understanding and repeated these instructions.  Pauline Sullivan RN

## 2018-09-18 NOTE — TELEPHONE ENCOUNTER
Vinayak Agudelo MD Diaz, Melissa A, RN                   Hi     Can you call the patient and tell her that she needs to start taking 50mg of vitamin b6.     Thanks     Vinayak Agudelo MD [facility]   Staff Neurologist   09/18/18

## 2018-09-24 ENCOUNTER — RADIANT APPOINTMENT (OUTPATIENT)
Dept: MRI IMAGING | Facility: CLINIC | Age: 43
End: 2018-09-24
Attending: PSYCHIATRY & NEUROLOGY
Payer: COMMERCIAL

## 2018-09-24 DIAGNOSIS — E66.01 MORBID OBESITY (H): ICD-10-CM

## 2018-09-24 DIAGNOSIS — R20.2 ARM PARESTHESIA, RIGHT: ICD-10-CM

## 2018-09-24 PROCEDURE — 70553 MRI BRAIN STEM W/O & W/DYE: CPT | Performed by: RADIOLOGY

## 2018-09-24 PROCEDURE — A9585 GADOBUTROL INJECTION: HCPCS | Performed by: PSYCHIATRY & NEUROLOGY

## 2018-09-24 PROCEDURE — 72141 MRI NECK SPINE W/O DYE: CPT | Performed by: RADIOLOGY

## 2018-09-24 RX ORDER — GADOBUTROL 604.72 MG/ML
10 INJECTION INTRAVENOUS ONCE
Status: COMPLETED | OUTPATIENT
Start: 2018-09-24 | End: 2018-09-24

## 2018-09-24 RX ADMIN — GADOBUTROL 8.5 ML: 604.72 INJECTION INTRAVENOUS at 09:02

## 2019-02-12 ENCOUNTER — OFFICE VISIT (OUTPATIENT)
Dept: URGENT CARE | Facility: URGENT CARE | Age: 44
End: 2019-02-12

## 2019-02-12 VITALS
BODY MASS INDEX: 36.33 KG/M2 | TEMPERATURE: 102.6 F | WEIGHT: 186 LBS | SYSTOLIC BLOOD PRESSURE: 155 MMHG | OXYGEN SATURATION: 96 % | DIASTOLIC BLOOD PRESSURE: 100 MMHG | RESPIRATION RATE: 18 BRPM | HEART RATE: 124 BPM

## 2019-02-12 DIAGNOSIS — R35.0 URINARY FREQUENCY: ICD-10-CM

## 2019-02-12 DIAGNOSIS — R50.9 FEVER AND CHILLS: ICD-10-CM

## 2019-02-12 DIAGNOSIS — J10.1 INFLUENZA A: Primary | ICD-10-CM

## 2019-02-12 LAB
ALBUMIN UR-MCNC: NEGATIVE MG/DL
APPEARANCE UR: CLEAR
BACTERIA #/AREA URNS HPF: ABNORMAL /HPF
BILIRUB UR QL STRIP: NEGATIVE
COLOR UR AUTO: YELLOW
FLUAV+FLUBV AG SPEC QL: NEGATIVE
FLUAV+FLUBV AG SPEC QL: POSITIVE
GLUCOSE UR STRIP-MCNC: NEGATIVE MG/DL
HGB UR QL STRIP: ABNORMAL
KETONES UR STRIP-MCNC: ABNORMAL MG/DL
LEUKOCYTE ESTERASE UR QL STRIP: ABNORMAL
NITRATE UR QL: NEGATIVE
NON-SQ EPI CELLS #/AREA URNS LPF: ABNORMAL /LPF
PH UR STRIP: 5.5 PH (ref 5–7)
RBC #/AREA URNS AUTO: ABNORMAL /HPF
SOURCE: ABNORMAL
SP GR UR STRIP: 1.02 (ref 1–1.03)
SPECIMEN SOURCE: ABNORMAL
UROBILINOGEN UR STRIP-ACNC: 1 EU/DL (ref 0.2–1)
WBC #/AREA URNS AUTO: ABNORMAL /HPF

## 2019-02-12 PROCEDURE — 87804 INFLUENZA ASSAY W/OPTIC: CPT | Performed by: NURSE PRACTITIONER

## 2019-02-12 PROCEDURE — 99214 OFFICE O/P EST MOD 30 MIN: CPT | Performed by: NURSE PRACTITIONER

## 2019-02-12 PROCEDURE — 81001 URINALYSIS AUTO W/SCOPE: CPT | Performed by: NURSE PRACTITIONER

## 2019-02-12 PROCEDURE — 87086 URINE CULTURE/COLONY COUNT: CPT | Performed by: NURSE PRACTITIONER

## 2019-02-12 RX ORDER — SULFAMETHOXAZOLE/TRIMETHOPRIM 800-160 MG
1 TABLET ORAL 2 TIMES DAILY
Qty: 6 TABLET | Refills: 0 | Status: SHIPPED | OUTPATIENT
Start: 2019-02-12 | End: 2019-02-15

## 2019-02-12 RX ORDER — OSELTAMIVIR PHOSPHATE 75 MG/1
75 CAPSULE ORAL 2 TIMES DAILY
Qty: 10 CAPSULE | Refills: 0 | Status: SHIPPED | OUTPATIENT
Start: 2019-02-12 | End: 2019-02-17

## 2019-02-12 ASSESSMENT — ENCOUNTER SYMPTOMS
COUGH: 1
MYALGIAS: 1
FEVER: 1
NAUSEA: 0
RHINORRHEA: 0
DIARRHEA: 0
CHILLS: 1
SORE THROAT: 0
FATIGUE: 1
FREQUENCY: 1
SHORTNESS OF BREATH: 0
VOMITING: 0
HEADACHES: 0

## 2019-02-12 NOTE — PROGRESS NOTES
SUBJECTIVE:   Paige Davis is a 43 year old female presenting with a chief complaint of   Chief Complaint   Patient presents with     URI     COUGH, SOB       She is an established patient of [facility].    URI Adult    Onset of symptoms was 2 day(s) ago.  Course of illness is worsening.    Severity moderate  Current and Associated symptoms: fever, chills, runny nose, stuffy nose, cough - productive, body aches and fatigue  Treatment measures tried include None tried.  Predisposing factors include None.  UTI    Onset of symptoms was 3day(s).  Course of illness is worsening  Severity moderate  Current and associated symptoms frequency  Treatment and measures tried None  Predisposing factors include none  Patient denies GFR less than 30 within the last year, vaginal discharge, vaginal odor and vaginal itching          Review of Systems   Constitutional: Positive for chills, fatigue and fever.   HENT: Positive for congestion. Negative for ear pain, rhinorrhea and sore throat.    Respiratory: Positive for cough. Negative for shortness of breath.    Gastrointestinal: Negative for diarrhea, nausea and vomiting.   Genitourinary: Positive for frequency.   Musculoskeletal: Positive for myalgias.   Neurological: Negative for headaches.   All other systems reviewed and are negative.      Past Medical History:   Diagnosis Date     Diabetes (H)      Hyperlipidemia      Hypertension      Family History   Problem Relation Age of Onset     Diabetes Mother      Diabetes Maternal Grandmother      Diabetes Maternal Grandfather      Current Outpatient Medications   Medication Sig Dispense Refill     LISINOPRIL PO Take 10 mg by mouth daily       metFORMIN (GLUCOPHAGE) 500 MG tablet Take 1 tablet (500 mg) by mouth 2 times daily (with meals) 180 tablet 1     methocarbamol (ROBAXIN) 750 MG tablet Take 1 tablet (750 mg) by mouth 3 times daily as needed 60 tablet 0     omeprazole (PRILOSEC) 20 MG DR capsule Take 20 mg by mouth        ORDER FOR DME LANCETS  each 4     ORDER FOR DME TEST STRIPS BID.  BRAND PER INSURANCE. 200 each 4     oseltamivir (TAMIFLU) 75 MG capsule Take 1 capsule (75 mg) by mouth 2 times daily for 5 days 10 capsule 0     sulfamethoxazole-trimethoprim (BACTRIM DS/SEPTRA DS) 800-160 MG tablet Take 1 tablet by mouth 2 times daily for 3 days 6 tablet 0     naproxen (NAPROSYN) 500 MG tablet Take 1 tablet (500 mg) by mouth 2 times daily (with meals) (Patient not taking: Reported on 2/12/2019) 30 tablet 1     nortriptyline (PAMELOR) 25 MG capsule Take 1 capsule (25 mg) by mouth At Bedtime (Patient not taking: Reported on 2/12/2019) 30 capsule 3     paragard intrauterine copper 1 each by Intrauterine route once Placed in 2012 1 each      pyridoxine (VITAMIN B-6) 50 MG TABS Take 1 tablet (50 mg) by mouth daily (Patient not taking: Reported on 2/12/2019)       triamcinolone (KENALOG) 0.1 % cream Apply sparingly to affected area three times daily for 14 days. (Patient not taking: Reported on 2/12/2019) 30 g 0     Social History     Tobacco Use     Smoking status: Never Smoker     Smokeless tobacco: Never Used   Substance Use Topics     Alcohol use: No       OBJECTIVE  BP (!) 155/100 (BP Location: Left arm, Patient Position: Chair, Cuff Size: Adult Large)   Pulse 124   Temp 102.6  F (39.2  C) (Oral)   Resp 18   Wt 84.4 kg (186 lb)   SpO2 96%   BMI 36.33 kg/m      Physical Exam   Constitutional: She appears lethargic. She has a sickly appearance.   HENT:   Head: Normocephalic and atraumatic.   Right Ear: Tympanic membrane and external ear normal.   Left Ear: Tympanic membrane and external ear normal.   Nose: Mucosal edema and rhinorrhea present.   Mouth/Throat: Oropharynx is clear and moist.   Eyes: EOM are normal. Pupils are equal, round, and reactive to light.   Neck: Normal range of motion. Neck supple.   Pulmonary/Chest: Effort normal and breath sounds normal. No respiratory distress.   Lymphadenopathy:     She has no  cervical adenopathy.   Neurological: She appears lethargic. No cranial nerve deficit.   Skin: Skin is warm and dry. She is not diaphoretic.   Psychiatric: She has a normal mood and affect.   Nursing note and vitals reviewed.      Labs:  Results for orders placed or performed in visit on 02/12/19 (from the past 24 hour(s))   Influenza A/B antigen   Result Value Ref Range    Influenza A/B Agn Specimen Nasal     Influenza A Positive (A) NEG^Negative    Influenza B Negative NEG^Negative   *UA reflex to Microscopic and Culture ([place] [facility] (except [place] and [place])   Result Value Ref Range    Color Urine Yellow     Appearance Urine Clear     Glucose Urine Negative NEG^Negative mg/dL    Bilirubin Urine Negative NEG^Negative    Ketones Urine Trace (A) NEG^Negative mg/dL    Specific Gravity Urine 1.020 1.003 - 1.035    Blood Urine Trace (A) NEG^Negative    pH Urine 5.5 5.0 - 7.0 pH    Protein Albumin Urine Negative NEG^Negative mg/dL    Urobilinogen Urine 1.0 0.2 - 1.0 EU/dL    Nitrite Urine Negative NEG^Negative    Leukocyte Esterase Urine Trace (A) NEG^Negative    Source Midstream Urine    Urine Microscopic   Result Value Ref Range    WBC Urine 0 - 5 OTO5^0 - 5 /HPF    RBC Urine O - 2 OTO2^O - 2 /HPF    Squamous Epithelial /LPF Urine Few FEW^Few /LPF    Bacteria Urine Few (A) NEG^Negative /HPF           ASSESSMENT:      ICD-10-CM    1. Influenza A J10.1 oseltamivir (TAMIFLU) 75 MG capsule   2. Urinary frequency R35.0 *UA reflex to Microscopic and Culture ([place] [facility] (except [place] and [place])     Urine Culture Aerobic Bacterial     sulfamethoxazole-trimethoprim (BACTRIM DS/SEPTRA DS) 800-160 MG tablet   3. Fever and chills R50.9         PLAN:  I discussed lab results with the patient.  I discussed the pathophysiology of influenza and viral etiology.  I reviewed the risks and benefits of various over the counter and prescription medications.  Additionally, we reviewed the  infectious nature of this condition and techniques to minimize transmission and future infections.   I also discussed signs and symptoms of the flu, if other household contacts develop these symptoms, they are to come into the clinic immediately, as anti-viral medications need to be started within the first 48 hours.      Will do a follow up culture on the UA  All questions are answered and patient is in agreement with plan      Patient Instructions     Patient Education     Influenza [Influenza: Adult]    La influenza, también conocida marissa la gripe, es valerie enfermedad viral. Es diferente a lo que se conoce marissa la gripe común (el resfriado). Es muy contagiosa y se pasa a otros en el aire por medio de la tos, el estornudo o por el contacto directo (tocando la persona enferma y después tocándose a spring propios ojos, nariz o boca).  La enfermedad comienza de 1 a 3 días después de ser expuesto al virus y dura de 1 a 2 semanas. Por lo general no se usa los antibióticos a menos de que aparezca valerie complicación (infección en el oído o el seno nasal o pulmonía).  Los síntomas pueden ser leves o severos y pueden incluir mucho cansancio (el querer quedarse en la cama todo el día), escalofríos, fiebre, dolor en los músculos, dolor con el movimiento de los ojos, dolor de delroy y valerie tos seca.  Cuidado En Casa:    Evite exponerse al humo de los cigarrillos (el suyo o el de otros).    Tylenol (acetaminofén) o ibuprofen (Advil) le ayudará a bajar la fiebre, aliviar el dolor muscular y el dolor de delroy. Los niños y adolescentes menores de los 18 años con esta enfermedad no deben delvis aspirina cuando tengan la influenza porque puede causarle daños al hígado.    Náusea y pérdida del apetito son comunes. Se recomienda valerie dieta ligera. Evite deshidratarse tomando de 6 a 8 vasos de líquido al día (agua, sodas, jugos, té, caldos, etc.). Los líquidos extras le ayudarán a que las secreciones de la nariz y pulmones salgan mas  fácil.    Medicinas para la  (el resfriado) que se puede comprar sin receta no ayudarán a cortar la duración de la enfermedad kathleen pueden ayudar con los siguientes síntomas: tos (Robitussin DM), dolor de garganta (pastillas o aerosol de Chloraseptic), la congestión nasal (Actifed, Sudafed o Chlortrimeton).    Permanezca en ghosh casa por lo menos hasta 24 horas después de que la fiebre haya desaparecido, sin necesidad de usar medicamentos que reduzcan la fiebre (tales marissa ibuprofeno [Advil]).  Seguimiento  con ghosh médico o según las instrucciones de nuestro personal si no ha crow dentro de 1 semana.  Nota: Si usted tiene 65 años de edad o mas, o si tiene enfermedades asma crónica o valerie enfermedad pulmonar obstructiva crónica (EPOC), recomendamos valerie vacuna neumococo cada 5 años y todos los adultos deberían ponerse valerie vacuna contra la influenza (gripe) cada otoño. Pregúntale a ghosh doctor acerca de esto.  Busque Prontamente Atención Médica  si algo de lo siguiente ocurre:    Tos con bastante flema de color o con flaca    Dolor de pecho, respiración entrecortada (sensación de ahogo), silbidos o dificultad al respirar    Dolor de delroy sanjay, dolor en la amadou, el ginger o los oídos    Aparece valerie nueva erupción    Fiebre de 100.4 F (38 C) o más rik, tomada oralmente, que no mejora con los medicamentos.    Confusión, cambios de conducta (comportamiento) o convulsiones (ataques)    Debilidad severa o mareos  Date Last Reviewed: 12/23/2014 2000-2018 [facility]. [address] [place], [place] [id number]. Todos los derechos reservados. Esta información no pretende sustituir la atención médica profesional. Sólo ghosh médico puede diagnosticar y tratar un problema de saniya.

## 2019-02-12 NOTE — LETTER
[facility]  [address]  [facility] [address]  Phone: [phone]    February 12, 2019        Paige Davis  [address]  [facility] [address]          To whom it may concern:    RE: Paige Davis    Patient was seen and treated today at our clinic. Please allow her to rest home 2/13/2019, 2/14/2019, 2/15/2019 and return to work 2/18/2019.  Patient may return to work 2/18/2019 with no restrictions.    Please contact me for questions or concerns.      Sincerely,        Jackelyn Escalona NP, APRN

## 2019-02-13 LAB
BACTERIA SPEC CULT: NORMAL
SPECIMEN SOURCE: NORMAL

## 2019-02-13 NOTE — PATIENT INSTRUCTIONS
Patient Education     Influenza [Influenza: Adult]    La influenza, también conocida marissa la gripe, es valerie enfermedad viral. Es diferente a lo que se conoce marissa la gripe común (el resfriado). Es muy contagiosa y se pasa a otros en el aire por medio de la tos, el estornudo o por el contacto directo (tocando la persona enferma y después tocándose a spring propios ojos, nariz o boca).  La enfermedad comienza de 1 a 3 días después de ser expuesto al virus y dura de 1 a 2 semanas. Por lo general no se usa los antibióticos a menos de que aparezca valerie complicación (infección en el oído o el seno nasal o pulmonía).  Los síntomas pueden ser leves o severos y pueden incluir mucho cansancio (el querer quedarse en la cama todo el día), escalofríos, fiebre, dolor en los músculos, dolor con el movimiento de los ojos, dolor de delroy y valerie tos seca.  Cuidado En Casa:    Evite exponerse al humo de los cigarrillos (el suyo o el de otros).    Tylenol (acetaminofén) o ibuprofen (Advil) le ayudará a bajar la fiebre, aliviar el dolor muscular y el dolor de delroy. Los niños y adolescentes menores de los 18 años con esta enfermedad no deben delvis aspirina cuando tengan la influenza porque puede causarle daños al hígado.    Náusea y pérdida del apetito son comunes. Se recomienda valerie dieta ligera. Evite deshidratarse tomando de 6 a 8 vasos de líquido al día (agua, sodas, jugos, té, caldos, etc.). Los líquidos extras le ayudarán a que las secreciones de la nariz y pulmones salgan mas fácil.    Medicinas para la  (el resfriado) que se puede comprar sin receta no ayudarán a cortar la duración de la enfermedad kathleen pueden ayudar con los siguientes síntomas: tos (Robitussin DM), dolor de garganta (pastillas o aerosol de Chloraseptic), la congestión nasal (Actifed, Sudafed o Chlortrimeton).    Permanezca en ghosh casa por lo menos hasta 24 horas después de que la fiebre haya desaparecido, sin necesidad de usar medicamentos que reduzcan la  fiebre (tales marissa ibuprofeno [Advil]).  Seguimiento  con ghosh médico o según las instrucciones de nuestro personal si no ha crow dentro de 1 semana.  Nota: Si usted tiene 65 años de edad o mas, o si tiene enfermedades asma crónica o valerie enfermedad pulmonar obstructiva crónica (EPOC), recomendamos valerie vacuna neumococo cada 5 años y todos los adultos deberían ponerse valerie vacuna contra la influenza (gripe) cada otoño. Pregúntale a ghosh doctor acerca de esto.  Busque Prontamente Atención Médica  si algo de lo siguiente ocurre:    Tos con bastante flema de color o con flaca    Dolor de pecho, respiración entrecortada (sensación de ahogo), silbidos o dificultad al respirar    Dolor de delroy sanjay, dolor en la amadou, el ginger o los oídos    Aparece valerie nueva erupción    Fiebre de 100.4 F (38 C) o más rik, tomada oralmente, que no mejora con los medicamentos.    Confusión, cambios de conducta (comportamiento) o convulsiones (ataques)    Debilidad severa o mareos  Date Last Reviewed: 12/23/2014 2000-2018 [facility] [employer]. [address] [place] [id number]. Todos los derechos reservados. Esta información no pretende sustituir la atención médica profesional. Sólo ghosh médico puede diagnosticar y tratar un problema de saniya.

## 2020-02-20 ENCOUNTER — OFFICE VISIT (OUTPATIENT)
Dept: URGENT CARE | Facility: URGENT CARE | Age: 45
End: 2020-02-20
Payer: COMMERCIAL

## 2020-02-20 VITALS
SYSTOLIC BLOOD PRESSURE: 142 MMHG | TEMPERATURE: 99.4 F | WEIGHT: 194.5 LBS | OXYGEN SATURATION: 96 % | HEART RATE: 94 BPM | DIASTOLIC BLOOD PRESSURE: 86 MMHG | BODY MASS INDEX: 37.99 KG/M2 | RESPIRATION RATE: 20 BRPM

## 2020-02-20 DIAGNOSIS — N89.8 VAGINAL ITCHING: ICD-10-CM

## 2020-02-20 DIAGNOSIS — R30.0 DYSURIA: ICD-10-CM

## 2020-02-20 DIAGNOSIS — I10 ESSENTIAL HYPERTENSION: ICD-10-CM

## 2020-02-20 DIAGNOSIS — E11.9 TYPE 2 DIABETES MELLITUS WITHOUT COMPLICATION, WITHOUT LONG-TERM CURRENT USE OF INSULIN (H): ICD-10-CM

## 2020-02-20 DIAGNOSIS — N30.01 ACUTE CYSTITIS WITH HEMATURIA: Primary | ICD-10-CM

## 2020-02-20 DIAGNOSIS — J06.9 VIRAL URI WITH COUGH: ICD-10-CM

## 2020-02-20 DIAGNOSIS — R50.9 FEVER, UNSPECIFIED FEVER CAUSE: ICD-10-CM

## 2020-02-20 LAB
ALBUMIN UR-MCNC: NEGATIVE MG/DL
APPEARANCE UR: ABNORMAL
BACTERIA #/AREA URNS HPF: ABNORMAL /HPF
BILIRUB UR QL STRIP: NEGATIVE
COLOR UR AUTO: YELLOW
FLUAV+FLUBV AG SPEC QL: NEGATIVE
FLUAV+FLUBV AG SPEC QL: NEGATIVE
GLUCOSE UR STRIP-MCNC: NEGATIVE MG/DL
HGB UR QL STRIP: ABNORMAL
KETONES UR STRIP-MCNC: NEGATIVE MG/DL
LEUKOCYTE ESTERASE UR QL STRIP: ABNORMAL
NITRATE UR QL: NEGATIVE
NON-SQ EPI CELLS #/AREA URNS LPF: ABNORMAL /LPF
PH UR STRIP: 5.5 PH (ref 5–7)
RBC #/AREA URNS AUTO: ABNORMAL /HPF
SOURCE: ABNORMAL
SP GR UR STRIP: 1.02 (ref 1–1.03)
SPECIMEN SOURCE: NORMAL
SPECIMEN SOURCE: NORMAL
UROBILINOGEN UR STRIP-ACNC: 0.2 EU/DL (ref 0.2–1)
WBC #/AREA URNS AUTO: ABNORMAL /HPF
WET PREP SPEC: NORMAL

## 2020-02-20 PROCEDURE — 87210 SMEAR WET MOUNT SALINE/INK: CPT | Performed by: NURSE PRACTITIONER

## 2020-02-20 PROCEDURE — 87804 INFLUENZA ASSAY W/OPTIC: CPT | Performed by: NURSE PRACTITIONER

## 2020-02-20 PROCEDURE — 81001 URINALYSIS AUTO W/SCOPE: CPT | Performed by: NURSE PRACTITIONER

## 2020-02-20 PROCEDURE — 99214 OFFICE O/P EST MOD 30 MIN: CPT | Performed by: NURSE PRACTITIONER

## 2020-02-20 RX ORDER — LISINOPRIL 10 MG/1
TABLET ORAL
COMMUNITY
Start: 2020-02-10

## 2020-02-20 RX ORDER — METFORMIN HCL 500 MG
TABLET, EXTENDED RELEASE 24 HR ORAL
COMMUNITY
Start: 2020-02-10

## 2020-02-20 RX ORDER — GLIPIZIDE 10 MG/1
10 TABLET, FILM COATED, EXTENDED RELEASE ORAL
COMMUNITY
Start: 2020-01-31 | End: 2020-05-02

## 2020-02-20 RX ORDER — ATORVASTATIN CALCIUM 20 MG/1
20 TABLET, FILM COATED ORAL
COMMUNITY
Start: 2018-03-21

## 2020-02-20 RX ORDER — NITROFURANTOIN MACROCRYSTAL 100 MG
100 CAPSULE ORAL 2 TIMES DAILY
Qty: 10 CAPSULE | Refills: 0 | Status: SHIPPED | OUTPATIENT
Start: 2020-02-20 | End: 2020-02-25

## 2020-02-20 NOTE — PATIENT INSTRUCTIONS
PREVENTION OF URINARY TRACT INFECTION    AVOID CHEMICAL IRRITTANTS: bath gels,  Perfumed products,  Deoderant pads or tampons, douching    CLOTHING that increases moisture and bacterial growth:  Nylon, lycra, pantihose, pantiliners     AVOID: tight clothing and thongs    ACIDIFY URINE: cranberry tablets instead of cranberry juice (with excess sugar) to acidify urine and decrease bacterial growth.     URINATE after intercourse    FLUIDS: 6-8 glasses water per day    Lifestyle Modifications to Manage Hypertension    Weight reduction to a body mass index of 18.5 to 24.9 will give rise to a systolic blood pressure reduction of  5 to 20 mm Hg.  Your current BMI is bowel movement.    Incorporating the DASH Diet (a diet rich in fruits, vegetables, and low-fat dairy products with a reduced content of saturated and total fat) is as effective as a single drug agent.  This will provide a reduction of  8 to 14 mm Hg    Dietary sodium restriction to no more than 100 mmol per day (2.4 g sodium or 6 g sodium chloride) will provide a reduction of  2 to 8 mm Hg.    Engage in regular aerobic activity such as brisk walking (at least 30 minutes per day, most days of the week). This will provide a reduction of  4 to 9 mm Hg.    Limit alcohol consumption to no more than two drinks (1 oz or 30 mL of alcohol; e.g., 24-oz beer, 10 oz of wine, or 3 oz of 80-proof whiskey) per day in most men and to no more than one drink per day in women and lighter-weight persons. This will provide a reduction of  2 to 4 mm Hg.    If continues to be elevated, we should consider using a low dose of a medication to improve this and lower your risk for heart attack and stroke.    Upper respiratory infections are usually caused by viruses and, sometimes certain bacteria.  Antibiotics don't help the vast majority of people recover any quicker even when caused by a bacteria.  The body will fight this infection but it needs to be treated well in order to help heal  itself.  Rest as needed.  It is ok to reduce food intake if appetite is poor but it is quite important to increase fluid intake.    For cough suppression take, dextromethorphan (Delysm) to suppress cough safely without significant risk of sedation.     Acetaminophen (Tylenol) may be taken up to 4000mg daily (3000mg if over 65) which would be 2 regular strength tablets (325mg) or two extra strength tablets(500mg) up to 4 times a day (3 times a day if over 65).   Check for acetaminophen in other OTC or prescription medications and be sure you add this in the maximum amount you take every day.    Too much acetaminophen can lead to serious liver damage. DO NOT TAKE if you have a history of liver disease.    Ibuprofen 200mg tablets may be taken up to 4 pills 4 times a day(three times a day if over 65)  to the maximum of 3200mg,  (2400mg if >65)  daily as needed for pain.   Take with food.  Don't take with aspirin, Aleve or other antiinflammatory medication or with warfarin. DO NOT TAKE if you have a history of kidney disease.

## 2020-02-20 NOTE — PROGRESS NOTES
SUBJECTIVE:   Paige Davis is a 44 year old female presenting with a chief complaint of   Chief Complaint   Patient presents with     Headache     Sx x3 days      Cough     Chest Pain     Back Pain   .  Blood sugars have been running high, between 160-170.      She also is c/o dysuria for about a week with some vaginal itching. She denies any new partners or vaginal discharge.    Onset of symptoms was 3 day(s) ago.  Course of illness is worsening.    Severity moderate  Current and Associated symptoms: chills, runny nose, cough - non-productive, headache, body aches and fatigue  Treatment measures tried include Tylenol/Ibuprofen and OTC Cough med.  Predisposing factors include None.    SKIN: no worrisome rashes, no worrisome moles, no worrisome lesions  EYES: no acute vision problems or changes  ENT: no ear problems, no mouth problems, no throat problems  CV: no chest pain, no palpitations, no new or worsening peripheral edema  GI: no nausea, no vomiting, no constipation, no diarrhea  : no frequency, no dysuria, no hematuria  NEURO: no weakness, no dizziness, no syncope, no headaches  ENDOCRINE: no temperature intolerance, no skin/hair changes  HEME: no easy bruising, no bleeding problems      Past Medical History:   Diagnosis Date     Diabetes (H)      Hyperlipidemia      Hypertension      Current Outpatient Medications   Medication Sig Dispense Refill     atorvastatin 20 MG PO tablet Take 20 mg by mouth       glipiZIDE 10 MG PO 24 hr tablet Take 10 mg by mouth       lisinopril 10 MG PO tablet        metFORMIN (GLUCOPHAGE) 500 MG tablet Take 1 tablet (500 mg) by mouth 2 times daily (with meals) 180 tablet 1     nitroFURantoin macrocrystal 100 MG PO capsule Take 1 capsule (100 mg) by mouth 2 times daily for 5 days 10 capsule 0     paragard intrauterine copper 1 each by Intrauterine route once Placed in 2012 1 each      LISINOPRIL PO Take 10 mg by mouth daily       metFORMIN 500 MG PO 24 hr tablet         methocarbamol (ROBAXIN) 750 MG tablet Take 1 tablet (750 mg) by mouth 3 times daily as needed (Patient not taking: Reported on 2/20/2020) 60 tablet 0     naproxen (NAPROSYN) 500 MG tablet Take 1 tablet (500 mg) by mouth 2 times daily (with meals) (Patient not taking: Reported on 2/12/2019) 30 tablet 1     nortriptyline (PAMELOR) 25 MG capsule Take 1 capsule (25 mg) by mouth At Bedtime (Patient not taking: Reported on 2/12/2019) 30 capsule 3     omeprazole (PRILOSEC) 20 MG DR capsule Take 20 mg by mouth       ORDER FOR DME LANCETS BID (Patient not taking: Reported on 2/20/2020) 200 each 4     ORDER FOR DME TEST STRIPS BID.  BRAND PER INSURANCE. (Patient not taking: Reported on 2/20/2020) 200 each 4     pyridoxine (VITAMIN B-6) 50 MG TABS Take 1 tablet (50 mg) by mouth daily (Patient not taking: Reported on 2/12/2019)       triamcinolone (KENALOG) 0.1 % cream Apply sparingly to affected area three times daily for 14 days. (Patient not taking: Reported on 2/12/2019) 30 g 0     Social History     Tobacco Use     Smoking status: Never Smoker     Smokeless tobacco: Never Used   Substance Use Topics     Alcohol use: No     Family History   Problem Relation Age of Onset     Diabetes Mother      Diabetes Maternal Grandmother      Diabetes Maternal Grandfather        OBJECTIVE  BP (!) 142/86   Pulse 94   Temp 99.4  F (37.4  C) (Oral)   Resp 20   Wt 88.2 kg (194 lb 8 oz)   SpO2 96%   BMI 37.99 kg/m          GENERAL APPEARANCE: alert and mild distress     EYES: PERRL, EOMI, sclera non-icteric     HENT: oral exam benign, mucus membranes intact, without ulcers or lesions     NECK: no adenopathy or asymmetry, thyroid normal to palpation     RESP: lungs clear to auscultation - no rales, rhonchi or wheezes     CV: regular rates and rhythm, no murmurs, rubs, or gallop     ABDOMEN:  soft, nontender, no HSM or masses and bowel sounds normal     MS: extremities normal- no gross deformities noted; normal muscle tone.     SKIN: no  suspicious lesions or rashes      Labs:  Results for orders placed or performed in visit on 02/20/20 (from the past 24 hour(s))   Influenza A/B antigen   Result Value Ref Range    Influenza A/B Agn Specimen Nasal     Influenza A Negative NEG^Negative    Influenza B Negative NEG^Negative   UA with Microscopic reflex to Culture   Result Value Ref Range    Color Urine Yellow     Appearance Urine Slightly Cloudy     Glucose Urine Negative NEG^Negative mg/dL    Bilirubin Urine Negative NEG^Negative    Ketones Urine Negative NEG^Negative mg/dL    Specific Gravity Urine 1.025 1.003 - 1.035    pH Urine 5.5 5.0 - 7.0 pH    Protein Albumin Urine Negative NEG^Negative mg/dL    Urobilinogen Urine 0.2 0.2 - 1.0 EU/dL    Nitrite Urine Negative NEG^Negative    Blood Urine Moderate (A) NEG^Negative    Leukocyte Esterase Urine Moderate (A) NEG^Negative    Source Midstream Urine     WBC Urine 5-10 (A) OTO5^0 - 5 /HPF    RBC Urine 2-5 (A) OTO2^O - 2 /HPF    Squamous Epithelial /LPF Urine Few FEW^Few /LPF    Bacteria Urine Moderate (A) NEG^Negative /HPF   Wet prep   Result Value Ref Range    Specimen Description Vagina     Wet Prep WBC'S seen  Many       Wet Prep No Trichomonas seen     Wet Prep No clue cells seen     Wet Prep No yeast seen        Serious Comorbid Conditions:  Diabetes      ICD-10-CM    1. Acute cystitis without hematuria N30.00    2. Fever, unspecified fever cause R50.9 Influenza A/B antigen   3. Vaginal itching N89.8 Wet prep   4. Dysuria R30.0 UA with Microscopic reflex to Culture   5. Type 2 diabetes mellitus without complication, without long-term current use of insulin (H) E11.9 Continue current plan of care.  Follow-up with primary care provider as scheduled.   6. Essential hypertension I10      Paige to follow up with Primary Care provider regarding elevated blood pressure.      Followup:  Return in about 2 weeks (around 3/5/2020), or if symptoms worsen or fail to improve.    Patient Instructions    PREVENTION OF URINARY TRACT INFECTION    AVOID CHEMICAL IRRITTANTS: bath gels,  Perfumed products,  Deoderant pads or tampons, douching    CLOTHING that increases moisture and bacterial growth:  Nylon, lycra, pantihose, pantiliners     AVOID: tight clothing and thongs    ACIDIFY URINE: cranberry tablets instead of cranberry juice (with excess sugar) to acidify urine and decrease bacterial growth.     URINATE after intercourse    FLUIDS: 6-8 glasses water per day    Lifestyle Modifications to Manage Hypertension    Weight reduction to a body mass index of 18.5 to 24.9 will give rise to a systolic blood pressure reduction of  5 to 20 mm Hg.  Your current BMI is bowel movement.    Incorporating the DASH Diet (a diet rich in fruits, vegetables, and low-fat dairy products with a reduced content of saturated and total fat) is as effective as a single drug agent.  This will provide a reduction of  8 to 14 mm Hg    Dietary sodium restriction to no more than 100 mmol per day (2.4 g sodium or 6 g sodium chloride) will provide a reduction of  2 to 8 mm Hg.    Engage in regular aerobic activity such as brisk walking (at least 30 minutes per day, most days of the week). This will provide a reduction of  4 to 9 mm Hg.    Limit alcohol consumption to no more than two drinks (1 oz or 30 mL of alcohol; e.g., 24-oz beer, 10 oz of wine, or 3 oz of 80-proof whiskey) per day in most men and to no more than one drink per day in women and lighter-weight persons. This will provide a reduction of  2 to 4 mm Hg.    If continues to be elevated, we should consider using a low dose of a medication to improve this and lower your risk for heart attack and stroke.          LEANN Warren, CNP  [facility] Provider

## 2023-03-22 ENCOUNTER — OFFICE VISIT (OUTPATIENT)
Dept: URGENT CARE | Facility: URGENT CARE | Age: 48
End: 2023-03-22

## 2023-03-22 VITALS
OXYGEN SATURATION: 99 % | WEIGHT: 169.5 LBS | TEMPERATURE: 97.5 F | HEART RATE: 77 BPM | SYSTOLIC BLOOD PRESSURE: 123 MMHG | DIASTOLIC BLOOD PRESSURE: 82 MMHG | BODY MASS INDEX: 33.1 KG/M2

## 2023-03-22 DIAGNOSIS — R30.0 DYSURIA: ICD-10-CM

## 2023-03-22 DIAGNOSIS — N76.0 BACTERIAL VAGINOSIS: Primary | ICD-10-CM

## 2023-03-22 DIAGNOSIS — B96.89 BACTERIAL VAGINOSIS: Primary | ICD-10-CM

## 2023-03-22 DIAGNOSIS — N89.8 VAGINAL DISCHARGE: ICD-10-CM

## 2023-03-22 LAB
ALBUMIN UR-MCNC: NEGATIVE MG/DL
APPEARANCE UR: ABNORMAL
BACTERIA #/AREA URNS HPF: ABNORMAL /HPF
BILIRUB UR QL STRIP: NEGATIVE
CLUE CELLS: PRESENT
COLOR UR AUTO: YELLOW
GLUCOSE UR STRIP-MCNC: NEGATIVE MG/DL
HGB UR QL STRIP: ABNORMAL
KETONES UR STRIP-MCNC: NEGATIVE MG/DL
LEUKOCYTE ESTERASE UR QL STRIP: ABNORMAL
NITRATE UR QL: NEGATIVE
PH UR STRIP: 6 [PH] (ref 5–7)
RBC #/AREA URNS AUTO: ABNORMAL /HPF
SP GR UR STRIP: 1.02 (ref 1–1.03)
SQUAMOUS #/AREA URNS AUTO: ABNORMAL /LPF
TRICHOMONAS, WET PREP: ABNORMAL
UROBILINOGEN UR STRIP-ACNC: 0.2 E.U./DL
WBC #/AREA URNS AUTO: ABNORMAL /HPF
WBC'S/HIGH POWER FIELD, WET PREP: ABNORMAL
YEAST, WET PREP: ABNORMAL

## 2023-03-22 PROCEDURE — 87086 URINE CULTURE/COLONY COUNT: CPT | Performed by: PHYSICIAN ASSISTANT

## 2023-03-22 PROCEDURE — 81001 URINALYSIS AUTO W/SCOPE: CPT | Performed by: PHYSICIAN ASSISTANT

## 2023-03-22 PROCEDURE — 87210 SMEAR WET MOUNT SALINE/INK: CPT | Performed by: PHYSICIAN ASSISTANT

## 2023-03-22 PROCEDURE — 99203 OFFICE O/P NEW LOW 30 MIN: CPT | Performed by: PHYSICIAN ASSISTANT

## 2023-03-22 RX ORDER — METRONIDAZOLE 500 MG/1
500 TABLET ORAL 2 TIMES DAILY
Qty: 14 TABLET | Refills: 0 | Status: SHIPPED | OUTPATIENT
Start: 2023-03-22 | End: 2023-03-29

## 2023-03-25 LAB — BACTERIA UR CULT: NO GROWTH

## 2024-02-09 ENCOUNTER — ANCILLARY PROCEDURE (OUTPATIENT)
Dept: GENERAL RADIOLOGY | Facility: CLINIC | Age: 49
End: 2024-02-09
Attending: PHYSICIAN ASSISTANT

## 2024-02-09 ENCOUNTER — OFFICE VISIT (OUTPATIENT)
Dept: URGENT CARE | Facility: URGENT CARE | Age: 49
End: 2024-02-09

## 2024-02-09 VITALS
SYSTOLIC BLOOD PRESSURE: 131 MMHG | BODY MASS INDEX: 33.12 KG/M2 | DIASTOLIC BLOOD PRESSURE: 85 MMHG | OXYGEN SATURATION: 98 % | RESPIRATION RATE: 16 BRPM | TEMPERATURE: 97.8 F | WEIGHT: 169.6 LBS | HEART RATE: 74 BPM

## 2024-02-09 DIAGNOSIS — M25.531 RIGHT WRIST PAIN: ICD-10-CM

## 2024-02-09 DIAGNOSIS — R05.3 PERSISTENT COUGH FOR 3 WEEKS OR LONGER: ICD-10-CM

## 2024-02-09 DIAGNOSIS — J20.9 ACUTE BRONCHITIS WITH COEXISTING CONDITION REQUIRING PROPHYLACTIC TREATMENT: Primary | ICD-10-CM

## 2024-02-09 DIAGNOSIS — E11.65 TYPE 2 DIABETES MELLITUS WITH HYPERGLYCEMIA, UNSPECIFIED WHETHER LONG TERM INSULIN USE (H): ICD-10-CM

## 2024-02-09 DIAGNOSIS — M65.4 DE QUERVAIN'S DISEASE (TENOSYNOVITIS): ICD-10-CM

## 2024-02-09 PROCEDURE — 99214 OFFICE O/P EST MOD 30 MIN: CPT | Performed by: PHYSICIAN ASSISTANT

## 2024-02-09 PROCEDURE — 71046 X-RAY EXAM CHEST 2 VIEWS: CPT | Mod: TC | Performed by: RADIOLOGY

## 2024-02-09 PROCEDURE — 73110 X-RAY EXAM OF WRIST: CPT | Mod: TC | Performed by: RADIOLOGY

## 2024-02-09 RX ORDER — AZITHROMYCIN 250 MG/1
TABLET, FILM COATED ORAL
Qty: 6 TABLET | Refills: 0 | Status: SHIPPED | OUTPATIENT
Start: 2024-02-09

## 2024-02-09 RX ORDER — BENZONATATE 100 MG/1
100 CAPSULE ORAL 3 TIMES DAILY PRN
Qty: 30 CAPSULE | Refills: 0 | Status: SHIPPED | OUTPATIENT
Start: 2024-02-09 | End: 2024-02-19

## 2024-02-09 NOTE — PROGRESS NOTES
Chief Complaint   Patient presents with    Urgent Care    Cough     Using ipad for  (ID#: )-I been having a cough for about 6 weeks, progressively worse, not letting me sleep, also my arm a little bit swollen (right arm) for about 2 months and would like some medications for it    Arm Problem     Xra- I see no pneumonia on CXR.  I see no wrist fracture.    Results for orders placed or performed in visit on 02/09/24   XR Wrist Right G/E 3 Views     Status: None    Narrative    XR WRIST RIGHT G/E 3 VIEWS 2/9/2024 1:07 PM    HISTORY: radial wrist pain; Persistent cough for 3 weeks or longer;  Right wrist pain    COMPARISON: None.      Impression    IMPRESSION: No fracture. Normal alignment. No degenerative changes.    POPEYE DAVIES MD         SYSTEM ID:  [id number]   Results for orders placed or performed in visit on 02/09/24   XR Chest 2 Views     Status: None    Narrative    CHEST TWO VIEWS 2/9/2024 1:07 PM     HISTORY: Persistent cough for 3 weeks or longer    COMPARISON: None.       Impression    IMPRESSION: No acute cardiopulmonary abnormality.    MILAD GAN MD         SYSTEM ID:  [id number]                 ASSESSMENT:     ICD-10-CM    1. Persistent cough for 3 weeks or longer  R05.3 XR Chest 2 Views     XR Wrist Right G/E 3 Views     Wrist/Arm/Hand Bracking Supplies Order Wrist Brace; Right; with thumb spica      2. Right wrist pain  M25.531 XR Wrist Right G/E 3 Views     Wrist/Arm/Hand Bracking Supplies Order Wrist Brace; Right; with thumb spica      3. De Quervain's disease (tenosynovitis)  M65.4       4. Type 2 diabetes mellitus with hyperglycemia, unspecified whether long term insulin use (H)  E11.65             PLAN: Persistent cough, bursitis with coexisting condition.  Z-[address]  Tess[place] Perles as needed for cough.  Right wrist pain, tenosynovitis.  Thumb spica wrist splint for 1 week then wear as needed.  Ice twice daily for 5 to 10 minutes over the next 3 to 5 days.  Topical  Voltaren 3 times daily.  Follow-up with Ortho 1 to 2 weeks.  I have discussed clinical findings with patient.  Side effects of medications discussed.  Symptomatic care is discussed.  I have discussed the possibility of  worsening symptoms and indication to RTC or go to the ER if they occur.  All questions are answered, patient indicates understanding of these issues and is in agreement with plan.   Patient care instructions are discussed/given at the end of visit.   Lots of rest and fluids.      Keiry Delgado PA-C      SUBJECTIVE:  48-year-old female presents for persistent dry cough for 6 weeks.  Started out wet but now is dry.  No history of asthma.  No wheezing.  Cough can be worse at night.  No fever.  Has had pneumonia once in the past.  Had Tdap 2015.  Type 2 diabetes.  Also with right wrist pain for 2 months.  No injury.  Has tried an over-the-counter splint without a metal stay in it.  Helps a little.      No Known Allergies    Past Medical History:   Diagnosis Date    Diabetes (H)     Hyperlipidemia     Hypertension        atorvastatin 20 MG PO tablet, Take 20 mg by mouth  glipiZIDE 10 MG PO 24 hr tablet, Take 10 mg by mouth  lisinopril 10 MG PO tablet,   LISINOPRIL PO, Take 10 mg by mouth daily  metFORMIN (GLUCOPHAGE) 500 MG tablet, Take 1 tablet (500 mg) by mouth 2 times daily (with meals)  metFORMIN 500 MG PO 24 hr tablet,   methocarbamol (ROBAXIN) 750 MG tablet, Take 1 tablet (750 mg) by mouth 3 times daily as needed (Patient not taking: Reported on 2/20/2020)  naproxen (NAPROSYN) 500 MG tablet, Take 1 tablet (500 mg) by mouth 2 times daily (with meals) (Patient not taking: Reported on 2/12/2019)  nortriptyline (PAMELOR) 25 MG capsule, Take 1 capsule (25 mg) by mouth At Bedtime (Patient not taking: Reported on 2/12/2019)  omeprazole (PRILOSEC) 20 MG DR capsule, Take 20 mg by mouth  ORDER FOR DME, LANCETS BID (Patient not taking: Reported on 2/20/2020)  ORDER FOR DME, TEST STRIPS BID.  BRAND PER  INSURANCE. (Patient not taking: Reported on 2/20/2020)  paragard intrauterine copper, 1 each by Intrauterine route once Placed in 2012  pyridoxine (VITAMIN B-6) 50 MG TABS, Take 1 tablet (50 mg) by mouth daily (Patient not taking: Reported on 2/12/2019)  triamcinolone (KENALOG) 0.1 % cream, Apply sparingly to affected area three times daily for 14 days. (Patient not taking: Reported on 2/12/2019)    No current facility-administered medications on file prior to visit.      Social History     Tobacco Use    Smoking status: Never    Smokeless tobacco: Never   Substance Use Topics    Alcohol use: No       ROS:  CONSTITUTIONAL: Negative for fatigue or fever.  EYES: Negative for eye problems.  ENT: As above.  RESP: As above.  CV: Negative for chest pains.  GI: Negative for vomiting.  MUSCULOSKELETAL:  Negative for significant muscle or joint pains.  NEUROLOGIC: Negative for headaches.  SKIN: Negative for rash.  PSYCH: Normal mentation for age.    OBJECTIVE:  /85 (BP Location: Left arm, Patient Position: Sitting, Cuff Size: Adult Large)   Pulse 74   Temp 97.8  F (36.6  C) (Tympanic)   Resp 16   Wt 76.9 kg (169 lb 9.6 oz)   SpO2 98%   BMI 33.12 kg/m    GENERAL APPEARANCE: Healthy, alert and no distress.  EYES:Conjunctiva/sclera clear.  EARS: No cerumen.   Ear canals w/o erythema.  TM's intact w/o erythema.    THROAT: No erythema w/o tonsillar enlargement . No exudates.  NECK: Supple, nontender, no lymphadenopathy.  RESP: Lungs clear to auscultation - no rales, rhonchi or wheezes  CV: Regular rate and rhythm, normal S1 S2, no murmur noted.  NEURO: Awake, alert    SKIN: No rashes  Right wrist with swelling radial lateral aspect.  Full range of motion but increased pain with range of motion.  Positive Finkelstein test.  Do feel some palpable crepitus.    Sensation intact  Good palpable radial pulse    Keiry Delgado PA-C

## 2024-02-12 ENCOUNTER — APPOINTMENT (OUTPATIENT)
Dept: INTERPRETER SERVICES | Facility: CLINIC | Age: 49
End: 2024-02-12